# Patient Record
Sex: FEMALE | Race: BLACK OR AFRICAN AMERICAN | Employment: FULL TIME | URBAN - METROPOLITAN AREA
[De-identification: names, ages, dates, MRNs, and addresses within clinical notes are randomized per-mention and may not be internally consistent; named-entity substitution may affect disease eponyms.]

---

## 2017-10-23 ENCOUNTER — HOSPITAL ENCOUNTER (OUTPATIENT)
Dept: MAMMOGRAPHY | Age: 42
Discharge: HOME | End: 2017-10-23
Attending: OBSTETRICS & GYNECOLOGY
Payer: COMMERCIAL

## 2017-10-23 DIAGNOSIS — Z12.31 VISIT FOR SCREENING MAMMOGRAM: ICD-10-CM

## 2017-10-23 PROCEDURE — 77063 BREAST TOMOSYNTHESIS BI: CPT

## 2018-05-08 ENCOUNTER — HOSPITAL ENCOUNTER (OUTPATIENT)
Dept: MAMMOGRAPHY | Age: 43
Discharge: HOME | End: 2018-05-08
Payer: COMMERCIAL

## 2018-05-08 DIAGNOSIS — N63.20 BREAST MASS, LEFT: ICD-10-CM

## 2018-05-08 DIAGNOSIS — N63.10 BREAST MASS, RIGHT: ICD-10-CM

## 2018-05-08 PROCEDURE — 77066 DX MAMMO INCL CAD BI: CPT

## 2018-05-08 PROCEDURE — 76642 ULTRASOUND BREAST LIMITED: CPT

## 2019-10-09 ENCOUNTER — HOSPITAL ENCOUNTER (OUTPATIENT)
Dept: MAMMOGRAPHY | Age: 44
Discharge: HOME | End: 2019-10-09
Attending: NURSE PRACTITIONER

## 2019-10-09 DIAGNOSIS — Z12.39 BREAST CANCER SCREENING: ICD-10-CM

## 2019-10-10 NOTE — PROGRESS NOTES
Mammogram is normal. Repeat in one year. Please call patient with results. Shared to my chart.  Thanks, Pamela Mcgill

## 2020-10-29 ENCOUNTER — HOSPITAL ENCOUNTER (OUTPATIENT)
Dept: MAMMOGRAPHY | Age: 45
Discharge: HOME | End: 2020-10-29
Attending: NURSE PRACTITIONER
Payer: COMMERCIAL

## 2020-10-29 DIAGNOSIS — Z12.31 VISIT FOR SCREENING MAMMOGRAM: ICD-10-CM

## 2020-10-29 PROCEDURE — 77063 BREAST TOMOSYNTHESIS BI: CPT

## 2021-07-12 ENCOUNTER — HOSPITAL ENCOUNTER (OUTPATIENT)
Dept: PHYSICAL THERAPY | Age: 46
Discharge: HOME | End: 2021-07-12
Attending: ORTHOPAEDIC SURGERY
Payer: COMMERCIAL

## 2021-07-12 DIAGNOSIS — M25.561 RIGHT KNEE PAIN, UNSPECIFIED CHRONICITY: ICD-10-CM

## 2021-07-12 PROCEDURE — 97140 MANUAL THERAPY 1/> REGIONS: CPT

## 2021-07-12 PROCEDURE — 97110 THERAPEUTIC EXERCISES: CPT

## 2021-07-12 PROCEDURE — 97161 PT EVAL LOW COMPLEX 20 MIN: CPT

## 2021-07-12 NOTE — THERAPY EVALUATION
Olman Crum  : 1975  Primary: [facility]  Secondary:  [address]  at . [address]  [address] [facility] 25, [address]  [phone]   [phone]         OUTPATIENT PHYSICAL THERAPY:Initial Assessment 2021    ICD-10: Treatment Diagnosis:  Pain in right knee (M25.561)  Stiffness of right knee, not elsewhere classified (M25.661)  Other abnormalities of gait and mobility (R26.89)          Precautions/Allergies:   Biaxin [clarithromycin], Darvocet a500 [propoxyphene n-acetaminophen], Metronidazole hcl, and Sulfa (sulfonamide antibiotics)   Fall Risk Score: 0 (? 5 = High Risk)  MD Orders: Eval and Treat  MEDICAL/REFERRING DIAGNOSIS:  Right knee pain, unspecified chronicity [M25.561]   DATE OF ONSET: Approximately May 2021  REFERRING PHYSICIAN: Jair Mejia MD  RETURN PHYSICIAN APPOINTMENT: 2021     INITIAL ASSESSMENT:  Ms. Sofia Scott presents to physical therapy with decreased strength, ROM, joint mobility, functional mobility. These S/S are consistent with right knee pain causing gait disturbance and limiting weightbearing activity. Patient will benefit from skilled physical therapy for manual therapeutic techniques (as appropriate), therapeutic exercises and activities, balance and comprehensive home exercises program to address current impairments and functional limitations. Olman Crum will benefit from skilled PT (medically necessary) in order to address above deficits affecting participation in basic ADLs and overall functional tolerance. PROBLEM LIST (Impacting functional limitations):   1. Decreased Strength  2. Decreased ADL/Functional Activities  3. Decreased Transfer Abilities  4. Decreased Ambulation Ability/Technique  5. Decreased Balance  6. Increased Pain  7. Decreased Joint mobility/Flexibility   8. Decreased Activity Tolerance  9.  Decreased [place] with Home Exercise Program INTERVENTIONS PLANNED:   1. Balance Exercise  2. Bed Mobility  3. Cold  4. Cryotherapy  5. Family Education  6. Gait Training  7. Heat  8. Home Exercise Program (HEP)  9. Manual Therapy  10. Neuromuscular Re-education/Strengthening  11. Range of Motion (ROM)  12. Therapeutic Activites  13. Therapeutic Exercise/Strengthening  14. Transfer Training  15. Ultrasound (US)  16. Vasopneumatic Compression  17. Aquatic Therapy          TREATMENT PLAN:  Effective Dates: 7/12/2021 TO 9/11/2021 (60 days). Frequency/Duration: 2 times a week for 60 Days    GOALS: (Goals have been discussed and agreed upon with patient.)   Short-Term Goals~4 weeks  Goal Met   1. Dolly Morgan will be independent with HEP for strength and ROM 1.  [] Date:   2. Dolly Morgan will tolerate manual therapy/joint mobilizations to increase knee flexion ROM so pt can ambulate stairs and walk with a more normalized gait pattern. 2.  [] Date:   3. Dolly Morgan will participate in LE strengthening exercises for hip, knee, ankle with weight as appropriate for 3 sets of 10. 3.  [] Date:   4. Dolly Morgan will tolerate scar massage as appropriate to improve tissue mobility with patient to perform independently after education 4. [] Date:   5. Dolly Morgan will participate in static and dynamic balance activities for 5 minutes to help improve proprioception and decrease risk of falls 5. [] Date:   6. Dolly Morgan to increase lower extremity functional scale by 10 points to show improvement in areas of difficulty 6. [] Date:   7. Dolly Morgan will demonstrate Right knee flexion >= 140 degrees to improve functional mobility and tolerance of ADLs. 7.  [] Date:   8. Dolly Morgan will demonstrate Right knee extension >= 0 degrees to improve functional mobility and tolerance of ADLs 8. [] Date:   5. Dolly Morgan will improve MMT Right LE to >=4+/5 to improve current level of independence and community reintegration. 9.  [] Date:         Long Term Goals~8 weeks Goal Met   1. Yesy Valladares will be independent in HEP of stretching and strengthening 1. [] Date:   2. Yesy Valladares will be able to perform sit to stand transfers independently with increased knee flexion and decreased use of upper extremities 2. [] Date:   3. Yesy Valladares will ascend/descend 12 steps with reciprocal gait pattern and rail 3. [] Date:   4. Yesy Valladares to increase lower extremity functional scale by 10 points to show improvement in areas of difficulty  4. [] Date:                 Outcome Measure: Tool Used: Lower Extremity Functional Scale (LEFS)    Score:  Initial: 68/80 Most Recent: X/80 (Date: -- )   Interpretation of Score: 20 questions each scored on a 5 point scale with 0 representing \"extreme difficulty or unable to perform\" and 4 representing \"no difficulty\". The lower the score, the greater the functional disability. 80/80 represents no disability. Minimal detectable change is 9 points. Medical Necessity:   · Skilled intervention continues to be required due to current impairment. Reason for Services/Other Comments:  · Patient continues to require skilled intervention due to patient continues to present with impairments assessed at initial evaluation and requiring skilled physical therapy to meet goals for PT. Total Treatment Duration:  PT Patient Time In/Time Out  Time In: 1620  Time Out: 1715      Rehabilitation Potential For Stated Goals: Excellent  Regarding Anastasiia Rocha's therapy, I certify that the treatment plan above will be carried out by a therapist or under their direction. Thank you for this referral,  Ally Martins, PT     Referring Physician Signature: Flaca Tidwell MD              Date                  HISTORY:   History of Present Injury/Illness (Reason for Referral):  Patient reports unknown cause of medial right knee pain starting in May of 2021.  She states that it may be related to increased exercise from March to May for which she has stopped secondary to pain. -Present Symptoms (on day of evaluation):       Pain Severity:  · Currently: 3/10  · Best: 0/10  · Worst: 6/10    · Aggravating factors: going up and down stairs, standing, walking, running   · Relieving factors: Rest and Ice  · Irritability: Medium (Onset of pain is equal to alleviation)    Past Medical History/Comorbidities:   Ms. Akila Palafox  has a past medical history of Arrhythmia (2004). Ms. Akila Palafox  has a past surgical history that includes pr cardiac surg procedure unlist (2004). Active Ambulatory Problems     Diagnosis Date Noted    No Active Ambulatory Problems     Resolved Ambulatory Problems     Diagnosis Date Noted    No Resolved Ambulatory Problems     Past Medical History:   Diagnosis Date    Arrhythmia 2004     Social History/Living Environment:        Social History     Socioeconomic History    Marital status:      Spouse name: Not on file    Number of children: Not on file    Years of education: Not on file    Highest education level: Not on file   Occupational History    Not on file   Tobacco Use    Smoking status: Never Smoker    Smokeless tobacco: Never Used   Substance and Sexual Activity    Alcohol use: No     Comment: occasional    Drug use: No    Sexual activity: Yes     Partners: Male   Other Topics Concern    Not on file   Social History Narrative    Not on file     Social Determinants of Health     Financial Resource Strain:     Difficulty of Paying Living Expenses:    Food Insecurity:     Worried About Running Out of Food in the Last Year:     Ran Out of Food in the Last Year:    Transportation Needs:     Lack of Transportation (Medical):      Lack of Transportation (Non-Medical):    Physical Activity:     Days of Exercise per Week:     Minutes of Exercise per Session:    Stress:     Feeling of Stress :    Social Connections:     Frequency of Communication with Friends and Family:     Frequency of Social Gatherings with Friends and Family:     Attends [employer] Services:     Active Member of Clubs or Organizations:     Attends Club or Organization Meetings:     Marital Status:    Intimate Partner Violence:     Fear of Current or Ex-Partner:     Emotionally Abused:     Physically Abused:     Sexually Abused:      Prior Level of Function/Work/Activity:  Normal  Previous Treatment Approach  Injections  Other Clinical Tests:  X-RAY Negative for Fx or MMT  Current Medications:    Current Outpatient Medications:     aspirin delayed-release 81 mg tablet, Take 81 mg by mouth., Disp: , Rfl:     ferrous sulfate (Slow Fe) 142 mg (45 mg iron) ER tablet, Take 1 Tablet by mouth daily. , Disp: , Rfl:     fexofenadine-pseudoephedrine (ALLEGRA-D 24 HOUR) 180-240 mg per tablet, Take 1 Tab by mouth daily. , Disp: , Rfl:       Ambulatory/Rehab Services H2 Model Falls Risk Assessment    Risk Factors:       No Risk Factors Identified Ability to Rise from Chair:       (0)  Ability to rise in a single movement    Falls Prevention Plan:       No modifications necessary   Total: (5 or greater = High Risk): 0    ©2010 [facility]. All Rights Reserved. [facility] [employer] Patent #[id number].  Federal Law prohibits the replication, distribution or use without written permission from [facility] [employer]         Date Last Reviewed:  7/12/2021   Number of Personal Factors/Comorbidities that affect the Plan of Care: 0: LOW COMPLEXITY   EXAMINATION:   Observation/Orthostatic Postural Assessment:   Gait:  Slight decreased SLS with increased pronation right  Slightly Genu Valgus right  Palpation:  Assessed @ Initial Visit: Tenderness to medial right knee    AROM/PROM         Joint: Eval Date: 7/12/2021  Re-Assess Date:  Re-Assess Date:    Active ROM RIGHT LEFT RIGHT LEFT RIGHT LEFT   Knee Extension 1 Hyper 1           Knee Flexion 127 140           Hip Flexion WNL WNL           Ankle mobility WNL WNL                                               Passive ROM Knee Extension 0 WNL           Knee Flexion 133 WNL             Strength:     Eval Date: 7/12/2021  Re-Assess Date:  Re-Assess Date:      RIGHT LEFT RIGHT LEFT RIGHT LEFT   Knee Flexion  4+/5  5/5           Knee Extension  4+/5  5/5           Hip Flexion  4+/5  5/5           Hip Abduction  4/5  4+/5           Hip Extension  4+/5  5/5           Ankle Dorsiflexion   5/5 5/5           Ankle Plantarflexion 5/5 5/5             Special Tests:   Valgus Stress: Negative  Varus Stress: Negative  Hussain: Negative      Manual:  Initial Evaluation           Joint Directon Grade Treatment Effect   Patella Lateral Glide, Medial Glide, Superior Glide and Inferior Glide II and III Unremarkable       Neurological Screen:  No radiating symptoms down leg    Functional Mobility:   Sit to Stand= Normal  Squat= 1/2  Single Leg Step Down= 6\"      Balance and Mobility:  Test Result   Timed up and Go 7 Seconds   30 second Sit to Stand 14   Single Leg Balance Right:        <30 seconds     Left:   <30 seconds          Body Structures Involved:  1. Bones  2. Joints  3. Muscles  4. Ligaments Body Functions Affected:  1. Sensory/Pain  2. Neuromusculoskeletal  3. Movement Related Activities and Participation Affected:  1. Mobility  2. Self Care   Number of elements that affect the Plan of Care: 1-2: LOW COMPLEXITY   CLINICAL PRESENTATION:   Presentation: Stable and uncomplicated: LOW COMPLEXITY   CLINICAL DECISION MAKING:      Use of outcome tool(s) and clinical judgement create a POC that gives a: Clear prediction of patient's progress: LOW COMPLEXITY   See treatment note for associated treatment provided today.       Future Appointments   Date Time Provider Gary Munoz   7/15/2021  7:30 AM Harvey Arce PT [facility]   7/19/2021  7:30 AM Harvey Arce PT [facility]   7/21/2021  7:30 AM DAVID VuBETSY [facility]   7/26/2021  7:30 AM Harvey Arce PT [facility]   7/29/2021  7:30 AM Lulion Arce, PT [facility]   8/2/2021  7:30 AM Luwanna Yazmin, PT [facility]   8/5/2021  7:30 AM Lujaymiea Yazmin, PT [facility]   8/9/2021  7:30 AM Harvey Arce, PT [facility]   8/12/2021  7:30 AM Lujaymiea Yazmin, PT [facility]   8/16/2021  7:30 AM Harvey Arce, PT [facility]   8/17/2021  9:45 AM Jane Keys MD [facility]   8/19/2021  7:30 AM Harvey Arce, PT [facility]   8/23/2021  7:30 AM Harvey Arce, PT [facility]   8/25/2021  7:30 AM Harvey Arce, PT [facility]         Marcha Axel, PT

## 2021-07-12 NOTE — PROGRESS NOTES
Liz Grullon  : 1975  Payor: [employer] / Plan: [facility] HMO/CHOICE PLUS/POS / Product Type: HMO /  [id number] at [address] [place] [address]. [address] [place] [address] [address] [place] [address], [facility], [address] [place] [address] Road  [phone]   [phone]                                                          Sixto Alba MD      OUTPATIENT PHYSICAL THERAPY: Daily Treatment Note 2021 Visit Count:  1    Tx Diagnosis:  Pain in right knee (M25.561)  Stiffness of right knee, not elsewhere classified (M25.661)  Other abnormalities of gait and mobility (R26.89)      Pre-treatment Symptoms/Complaints: See Initial Eval Dated 2021 for more details. Pain: Initial:3/10  Medications Last Reviewed:  2021     Post Session: 1/10   Updated Objective Findings: See Initial Eval for more details. TREATMENT:   THERAPEUTIC EXERCISE: (15 minutes):  Exercises per grid below to improve mobility, strength and balance. Required minimal visual, verbal and manual cues to promote proper body alignment and promote proper body posture. Progressed resistance and complexity of movement as indicated. Date:  2021 Date:   Date:     Activity/Exercise Parameters Parameters Parameters   Education HEP, POC, PT goals, anatomy/pathology     TM      Nustep      Calf stretch [id number]     Hamstring stretch 2x90cxi     Piriformis stretch [id number]     IT Band stretch 1z55mfl     Quad set [id number]     SLR 2x10     Heel slide      LAQ            THERAPEUTIC ACTIVITY: ( 0 minutes): Activities per gid below to improve functional movement related mobility, strength and balance to improve neuro-muscular carryover to daily functional activities for improving patient's quality of life. Required visual, verbal and manual cues to promote proper body alignment and promote proper body posture/mechanics. Progressed resistance and complexity of movement as indicated.      Date:  2021 Date:   Date:     Activity/Exercise Parameters Parameters Parameters                                                                               MANUAL THERAPY: (8 minutes): Joint mobilization, Soft tissue mobilization was utilized and necessary because of the patient's restricted joint motion and restricted motion of soft tissue mobility. Date  7/12/2021    Technique Used Grade  Level # Time(s) Effect while being performed          Patella 4 way II III Right 4min Improved Mobility          PROM  Right LE 4min Improved ROM          Tissue Mobilization                           HEP Log Date 1.    7/12/2021   2.  7/12/2021   3. 7/12/2021   4.    5.           [employer] Portal  Treatment/Session Summary:    Response to Treatment: Pt demonstrated understanding of POC and initial HEP. No increase in pain or adverse reactions. Communication/Consultation:  POC, HEP, PT goals, Faxed initial evaluation to MD.   Equipment provided today: HEP Handout   Recommendations/Intent for next treatment session:   Next visit will focus on Manual Therapy Core Stability Quad strengthening Hip strengthening soft tissue mobilization. Treatment Plan of Care Effective Dates: 7/12/2021 TO 9/11/2021 (60 days).   Frequency/Duration: 2 times a week for 60 Days             Total Treatment Billable Duration:   23  Rx plus Eval   PT Patient Time In/Time Out  Time In: 1620  Time Out: [address] Eric Jo, PT    Future Appointments   Date Time Provider Gary Munoz   7/15/2021  7:30 AM [facility]ell, [facility]   7/19/2021  7:30 AM Rudolm Sell, PT [facility]   7/21/2021  7:30 AM Rudolm Sell, PT [facility]   7/26/2021  7:30 AM Rudolm Sell, PT [facility]   7/29/2021  7:30 AM Rudolm Sell, PT [facility]   8/2/2021  7:30 AM Rudolm Sell, PT [facility]   8/5/2021  7:30 AM Rudolm Sell, PT [facility]   8/9/2021  7:30 AM Rudolm Sell, PT [facility]   8/12/2021  7:30 AM Dee Estrin, PT [facility]   8/16/2021  7:30 AM Dee Estrin, PT [facility]   8/17/2021  9:45 AM Willie Joe MD [facility]   8/19/2021  7:30 AM Dee Estrin, PT [facility]   8/23/2021  7:30 AM Dee Estrin, PT [facility]   8/25/2021  7:30 AM Dee Estrin, PT [facility]

## 2021-07-15 ENCOUNTER — HOSPITAL ENCOUNTER (OUTPATIENT)
Dept: PHYSICAL THERAPY | Age: 46
Discharge: HOME | End: 2021-07-15
Attending: ORTHOPAEDIC SURGERY
Payer: COMMERCIAL

## 2021-07-15 PROCEDURE — 97110 THERAPEUTIC EXERCISES: CPT

## 2021-07-15 PROCEDURE — 97530 THERAPEUTIC ACTIVITIES: CPT

## 2021-07-15 NOTE — PROGRESS NOTES
Nathan Uribe  : 1975  Payor: [facility] / Plan: [facility] HMO/CHOICE PLUS/POS / Product Type: HMO /  [address] [place]. [address] [facility] [address] [facility], [facility], [address] [place] [address] Road  [phone]   [phone]                                                          Cece Rubio MD      OUTPATIENT PHYSICAL THERAPY: Daily Treatment Note 7/15/2021 Visit Count:  2    Tx Diagnosis:  Pain in right knee (M25.561)  Stiffness of right knee, not elsewhere classified (M25.661)  Other abnormalities of gait and mobility (R26.89)      Pre-treatment Symptoms/Complaints: Patient reports less burning in right knee   Pain: Initial:2/10  Medications Last Reviewed:  7/15/2021     Post Session: 1/10   Updated Objective Findings: See Initial Eval for more details. TREATMENT:   THERAPEUTIC EXERCISE: (32 minutes):  Exercises per grid below to improve mobility, strength and balance. Required minimal visual, verbal and manual cues to promote proper body alignment and promote proper body posture. Progressed resistance and complexity of movement as indicated. Date:  2021 Date:  7/15/2021 Date:     Activity/Exercise Parameters Parameters Parameters   Education HEP, POC, PT goals, anatomy/pathology     TM      Bike  8min    Calf stretch [id number] 0l19gbx    Hamstring stretch [id number] L 8u82xuo    Piriformis stretch [id number] Seated 2f17vpc    IT Band stretch 2s63vvb     Quad set 26l6dew     SLR 2x10 Combo 2x10 w/SAQ          LAQ  3x10     Squat  3x8 Abd red    3 way hip swing  2x10 pad                THERAPEUTIC ACTIVITY: ( 8 minutes): Activities per gid below to improve functional movement related mobility, strength and balance to improve neuro-muscular carryover to daily functional activities for improving patient's quality of life. Required visual, verbal and manual cues to promote proper body alignment and promote proper body posture/mechanics.  Progressed resistance and complexity of movement as indicated. Date:  7/15/2021 Date:   Date:     Activity/Exercise Parameters Parameters Parameters   Lateral Walk 3x20' red       Monster Walk 3x20' red                                                               MANUAL THERAPY: (0 minutes): Joint mobilization, Soft tissue mobilization was utilized and necessary because of the patient's restricted joint motion and restricted motion of soft tissue mobility. Date  7/15/2021    Technique Used Grade  Level # Time(s) Effect while being performed          Patella 4 way II III Right 0min Improved Mobility          PROM  Right LE 0min Improved ROM          Tissue Mobilization                           HEP Log Date 1.    7/15/2021   2.  7/15/2021   3. 7/15/2021   4.    5.           [employer] Portal  Treatment/Session Summary:    Response to Treatment: Patient was challenged with increase requiring verbal cueing for VMO contraction and valgus control   Communication/Consultation:  Review posture   Equipment provided today: Not today   Recommendations/Intent for next treatment session:   Next visit will focus on Manual Therapy Core Stability Quad strengthening Hip strengthening soft tissue mobilization. Treatment Plan of Care Effective Dates: 7/12/2021 TO 9/11/2021 (60 days).   Frequency/Duration: 2 times a week for 60 Days             Total Treatment Billable Duration:   40 Rx  PT Patient Time In/Time Out  Time In: 0730  Time Out: 29[address] Carisa Sands PT    Future Appointments   Date Time Provider Gary Munoz   7/19/2021  7:30 AM Deanna [facility]onseca PT [facility] [place] [facility]   7/21/2021  7:30 AM Deanna Fonseca, PT [facility]   7/26/2021  7:30 AM Deanna Fonseca PT [facility]   7/29/2021  7:30 AM Deanna Fonseca, PT [facility]   8/2/2021  7:30 AM Deanna Fonseca, PT [facility]   8/5/2021  7:30 AM Deanna Fonseca, PT [facility]   8/9/2021 7:30 AM Satira Lent, PT [facility]   8/12/2021  7:30 AM Satira Lent, PT [facility]   8/16/2021  7:30 AM Satira Lent, PT [facility]   8/17/2021  9:45 AM Nayana Palomares MD [facility]   8/19/2021  7:30 AM Satira Lent, PT [facility]   8/23/2021  7:30 AM Satira Lent, PT [facility]   8/25/2021  7:30 AM Satira Lent, PT [facility]

## 2021-07-19 ENCOUNTER — HOSPITAL ENCOUNTER (OUTPATIENT)
Dept: PHYSICAL THERAPY | Age: 46
Discharge: HOME | End: 2021-07-19
Attending: ORTHOPAEDIC SURGERY
Payer: COMMERCIAL

## 2021-07-19 PROCEDURE — 97110 THERAPEUTIC EXERCISES: CPT

## 2021-07-19 PROCEDURE — 97530 THERAPEUTIC ACTIVITIES: CPT

## 2021-07-19 NOTE — PROGRESS NOTES
Baljit Rodriguez  : 1975  Payor: Shahzad Plunkett / Plan: BSI [facility] HMO/CHOICE PLUS/POS / Product Type: HMO /  [address] at [address] [place]. [address] [place], [address] [address]  [phone]   [phone]                                                          Radha Kelly MD      OUTPATIENT PHYSICAL THERAPY: Daily Treatment Note 2021 Visit Count:  3    Tx Diagnosis:  Pain in right knee (M25.561)  Stiffness of right knee, not elsewhere classified (M25.661)  Other abnormalities of gait and mobility (R26.89)      Pre-treatment Symptoms/Complaints: Patient reports doing some better just mild soreness   Pain: Initial:1/10  Medications Last Reviewed:  2021     Post Session: 1/10   Updated Objective Findings: Anterior rotation right innominate        TREATMENT:   THERAPEUTIC EXERCISE: (31 minutes):  Exercises per grid below to improve mobility, strength and balance. Required minimal visual, verbal and manual cues to promote proper body alignment and promote proper body posture. Progressed resistance and complexity of movement as indicated. Date:  2021 Date:  7/15/2021 Date:  2021   Activity/Exercise Parameters Parameters Parameters   Education HEP, POC, PT goals, anatomy/pathology     TM      Bike  8min 8 min   Calf stretch 0g23odm 1r72cgp 1r80vsy   Hamstring stretch 2v46odn L 6r36ltn L 2a84sps   Piriformis stretch 4p81kre Seated 6u92rce Seated 8g25xig   IT Band stretch 3d30zvf     Quad set 51r9gnu     SLR 2x10 Combo 2x10 w/SAQ    Squat stretch   8z59xmn   LAQ  3x10 5#    Squat  3x8 Abd red 3x8 15 # Abd orange    3 way hip swing  2x10 pad Static 8m49fvk each   Curtsy squat   3x8 each         THERAPEUTIC ACTIVITY: ( 12 minutes): Activities per gid below to improve functional movement related mobility, strength and balance to improve neuro-muscular carryover to daily functional activities for improving patient's quality of life. Required visual, verbal and manual cues to promote proper body alignment and promote proper body posture/mechanics. Progressed resistance and complexity of movement as indicated. Date:  7/15/2021 Date:  7/19/2021 Date:     Activity/Exercise Parameters Parameters Parameters   Lateral Walk 3x20' red 3x20'orange     Monster Walk 3x20' red 3x20' orange     [place] Carry   3 laps                                                   MANUAL THERAPY: (0 minutes): Joint mobilization, Soft tissue mobilization was utilized and necessary because of the patient's restricted joint motion and restricted motion of soft tissue mobility. Date  7/19/2021    Technique Used Grade  Level # Time(s) Effect while being performed          Patella 4 way II III Right 0min Improved Mobility          PROM  Right LE 0min Improved ROM          Tissue Mobilization                           HEP Log Date 1.    7/19/2021   2.  7/19/2021   3. 7/19/2021   4.    5.           [employer] Portal  Treatment/Session Summary:    Response to Treatment: Patient had good tolerance to increased activity, equal innominate after MET   Communication/Consultation:  Review posture   Equipment provided today: Not today   Recommendations/Intent for next treatment session:   Next visit will focus on Manual Therapy Core Stability Quad strengthening Hip strengthening soft tissue mobilization. Treatment Plan of Care Effective Dates: 7/12/2021 TO 9/11/2021 (60 days).   Frequency/Duration: 2 times a week for 60 Days             Total Treatment Billable Duration:   43 Rx  PT Patient Time In/Time Out  Time In: 0730  Time Out: 2900 Carisa Sands PT    Future Appointments   Date Time Provider Gary Munoz   7/21/2021  7:30 AM Saintclair Res, PT [facility]   7/26/2021  7:30 AM Saintclair Res, PT [facility]   7/29/2021  7:30 AM Saintclair Res[facility]   8/2/2021  7:30 AM Saintclair Res, PT [facility]   8/5/2021  7:30 AM Rebbecca Sham, PT [facility]   8/9/2021  7:30 AM Rebbecca Sham, PT [facility]   8/12/2021  7:30 AM Rebbecca Sham, PT [facility]   8/16/2021  7:30 AM Rebbecca Sham, PT [facility]   8/17/2021  9:45 AM Sixto Alab MD [facility]   8/19/2021  7:30 AM Rebbecca Sham, PT [facility]   8/23/2021  7:30 AM Rebbecca Sham, PT [facility]   8/25/2021  7:30 AM Rebbecca Sham, PT [facility]

## 2021-07-21 ENCOUNTER — HOSPITAL ENCOUNTER (OUTPATIENT)
Dept: PHYSICAL THERAPY | Age: 46
Discharge: HOME | End: 2021-07-21
Attending: ORTHOPAEDIC SURGERY
Payer: COMMERCIAL

## 2021-07-21 PROCEDURE — 97110 THERAPEUTIC EXERCISES: CPT

## 2021-07-21 PROCEDURE — 97530 THERAPEUTIC ACTIVITIES: CPT

## 2021-07-21 NOTE — PROGRESS NOTES
Chris Mukherjee  : 1975  Payor: Ewelina Frames / Plan: [facility] HMO/CHOICE PLUS/POS / Product Type: HMO /  Kivalina Stagger at [address] [place]. [address], [id number] [place] [address] Road  [phone]   [phone]                                                          Miranda Solomon MD      OUTPATIENT PHYSICAL THERAPY: Daily Treatment Note 2021 Visit Count:  4    Tx Diagnosis:  Pain in right knee (M25.561)  Stiffness of right knee, not elsewhere classified (M25.661)  Other abnormalities of gait and mobility (R26.89)      Pre-treatment Symptoms/Complaints: Patient reports less soreness and increasing in activity, still feel unsure about returning to workouts   Pain: Initial:1/10  Medications Last Reviewed:  2021     Post Session: 1/10   Updated Objective Findings: Equal innominate        TREATMENT:   THERAPEUTIC EXERCISE: (31 minutes):  Exercises per grid below to improve mobility, strength and balance. Required minimal visual, verbal and manual cues to promote proper body alignment and promote proper body posture. Progressed resistance and complexity of movement as indicated.      Date:  2021 Date:  7/15/2021 Date:  2021 Date:  2021   Activity/Exercise Parameters Parameters Parameters    Education HEP, POC, PT goals, anatomy/pathology      TM       Bike  8min 8 min 6min   Calf stretch 9k86hhz 5j12ndi 5x91kmf 4g74mot   Hamstring stretch 8w81avl L 4s60afr L 6b21lvt L 8h00pkh   Piriformis stretch 9i46rsw Seated 4w21hgr Seated 3c94tib Seated 8g56vei   IT Band stretch 6t06fhw      Dynamic stretch       Quad set 24j2tus      SLR 2x10 Combo 2x10 w/SAQ     Squat stretch   7j06yzt    LAQ  3x10 5#     Squat  3x8 Abd red 3x8 15 # Abd orange  3x5 20 # Abd orange touch   3 way hip swing  2x10 pad Static 6c05yky each Squat reach pad 2x5 each   Curtsy squat   3x8 each    Walking Lunge    6x20'   Shuttle    3x10 100#                THERAPEUTIC ACTIVITY: ( 13 minutes): Activities per gid below to improve functional movement related mobility, strength and balance to improve neuro-muscular carryover to daily functional activities for improving patient's quality of life. Required visual, verbal and manual cues to promote proper body alignment and promote proper body posture/mechanics. Progressed resistance and complexity of movement as indicated. Date:  7/15/2021 Date:  7/19/2021 Date:  7/21/2021   Activity/Exercise Parameters Parameters Parameters   Lateral Walk 3x20' red 3x20'orange 4x20'orange   Monster Walk 3x20' red 3x20' orange 4x20'orange   [place]   3 laps 4 laps 2 15#                                                 MANUAL THERAPY: (0 minutes): Joint mobilization, Soft tissue mobilization was utilized and necessary because of the patient's restricted joint motion and restricted motion of soft tissue mobility. Date  7/21/2021    Technique Used Grade  Level # Time(s) Effect while being performed          Patella 4 way II III Right 0min Improved Mobility          PROM  Right LE 0min Improved ROM          Tissue Mobilization                           HEP Log Date 1.    7/21/2021   2.  7/21/2021   3. 7/21/2021   4.    5.           [employer] Portal  Treatment/Session Summary:    Response to Treatment: Patient had good tolerance to increased activity and less soreness in medial right knee. Communication/Consultation:  Review posture   Equipment provided today: Not today   Recommendations/Intent for next treatment session:   Next visit will focus on Manual Therapy Core Stability Quad strengthening Hip strengthening soft tissue mobilization. Treatment Plan of Care Effective Dates: 7/12/2021 TO 9/11/2021 (60 days).   Frequency/Duration: 2 times a week for 60 Days             Total Treatment Billable Duration:   44 Rx  PT Patient Time In/Time Out  Time In: 8865  Time Out: 402 [facility], PT    Future Appointments Date Time Provider Gary Munoz   7/26/2021  7:30 AM Pleas Brod, PT [facility]   7/29/2021  7:30 AM Pleas Brod, PT [facility]   8/2/2021  7:30 AM Pleas Brod, PT [facility]   8/5/2021  7:30 AM Pleas Brod, PT [facility]   8/9/2021  7:30 AM Pleas Brod, PT [facility]   8/12/2021  7:30 AM Pleas Brod, PT [facility]   8/16/2021  7:30 AM Pleas Brod, PT [facility]   8/17/2021  9:45 AM Miguelito Rodney MD [facility]   8/19/2021  7:30 AM Pleas Brod, PT [facility]   8/23/2021  7:30 AM Pleas Brod, PT [facility]   8/25/2021  7:30 AM Pleas Brod, PT [facility]

## 2021-07-26 ENCOUNTER — HOSPITAL ENCOUNTER (OUTPATIENT)
Dept: PHYSICAL THERAPY | Age: 46
Discharge: HOME | End: 2021-07-26
Attending: ORTHOPAEDIC SURGERY
Payer: COMMERCIAL

## 2021-07-26 PROCEDURE — 97110 THERAPEUTIC EXERCISES: CPT

## 2021-07-26 PROCEDURE — 97530 THERAPEUTIC ACTIVITIES: CPT

## 2021-07-26 NOTE — PROGRESS NOTES
Aman Hassan  : 1975  Payor: Yazmin Yung / Plan: [facility]SI [facility] HMO/CHOICE PLUS/POS / Product Type: HMO /  [address] at [address] [place]. [address], [address], [address] [place] [address] Road  [phone]   [phone]                                                          Sweetie Boucher MD      OUTPATIENT PHYSICAL THERAPY: Daily Treatment Note 2021 Visit Count:  5    Tx Diagnosis:  Pain in right knee (M25.561)  Stiffness of right knee, not elsewhere classified (M25.661)  Other abnormalities of gait and mobility (R26.89)      Pre-treatment Symptoms/Complaints: Patient reports feeling stronger   Pain: Initial:1/10  Medications Last Reviewed:  2021     Post Session: 0/10   Updated Objective Findings: Anterior rotation right innominate        TREATMENT:   THERAPEUTIC EXERCISE: (33 minutes):  Exercises per grid below to improve mobility, strength and balance. Required minimal visual, verbal and manual cues to promote proper body alignment and promote proper body posture. Progressed resistance and complexity of movement as indicated.      Date:  2021 Date:  7/15/2021 Date:  2021 Date:  2021 Date:  2021   Activity/Exercise Parameters Parameters Parameters     Education HEP, POC, PT goals, anatomy/pathology       TM        Bike  8min 8 min 6min 8 min   Calf stretch 0u29yrt 9d07ybh 5h97gpx 3e54taf 3g49tup   Hamstring stretch 3m94lay L 9d43erb L 4r77egf L 2d82djq L 6n19uxl   Piriformis stretch 7m17ede Seated 9s23abz Seated 1k14ekw Seated 8o58hys Seated 3n28nno   IT Band stretch 9c84llu       Dynamic stretch     2min   Quad set 00t8xxt       SLR 2x10 Combo 2x10 w/SAQ      Squat stretch   1c04rjd     LAQ  3x10 5#      Squat  3x8 Abd red 3x8 15 # Abd orange  3x5 20 # Abd orange touch    3 way hip swing  2x10 pad Static 0n66ttn each Squat reach pad 2x5 each    Curtsy squat   3x8 each  Rings 3x8 each   Walking Lunge    6x20' 6x20'  2 10# Shuttle    3x10 100# 3x10 125#   RDL     2x10 5#   Lateral shuffle      4x30'         THERAPEUTIC ACTIVITY: ( 11 minutes): Activities per gid below to improve functional movement related mobility, strength and balance to improve neuro-muscular carryover to daily functional activities for improving patient's quality of life. Required visual, verbal and manual cues to promote proper body alignment and promote proper body posture/mechanics. Progressed resistance and complexity of movement as indicated. Date:  7/15/2021 Date:  7/19/2021 Date:  7/21/2021 Date:  7/26/2021   Activity/Exercise Parameters Parameters Parameters    Lateral Walk 3x20' red 3x20'orange 4x20'orange 4x20'purple   Monster Walk 3x20' red 3x20' orange 4x20'orange 4x20'purple   [employer]   3 laps 4 laps 2 15# 4 laps 2 20#                                                         MANUAL THERAPY: (0 minutes): Joint mobilization, Soft tissue mobilization was utilized and necessary because of the patient's restricted joint motion and restricted motion of soft tissue mobility. Date  7/26/2021    Technique Used Grade  Level # Time(s) Effect while being performed          Patella 4 way II III Right 0min Improved Mobility          PROM  Right LE 0min Improved ROM          Tissue Mobilization                           HEP Log Date 1.    7/26/2021   2.  7/26/2021   3. 7/26/2021   4.    5.           [employer] Portal  Treatment/Session Summary:    Response to Treatment: Patient responded well to increased strengthening, improved stability with SL activity. Communication/Consultation:  Review posture   Equipment provided today: Not today   Recommendations/Intent for next treatment session:   Next visit will focus on Manual Therapy Core Stability Quad strengthening Hip strengthening soft tissue mobilization. Treatment Plan of Care Effective Dates: 7/12/2021 TO 9/11/2021 (60 days).   Frequency/Duration: 2 times a week for 60 Days             Total Treatment Billable Duration:   44 Rx  PT Patient Time In/Time Out  Time In: 0554  Time Out: 402 [facility], PT    Future Appointments   Date Time Provider Gary Munoz   7/29/2021  7:30 AM Tiffani Negron, PT [facility]   8/2/2021  7:30 AM Ranny Jamil, PT [facility]   8/5/2021  7:30 AM Ranny Jamil, PT [facility]   8/9/2021  7:30 AM Ranny Jamil, PT [facility]   8/12/2021  7:30 AM Ranny Jamil, PT [facility]   8/16/2021  7:30 AM Ranny Jamil, PT [facility]   8/17/2021  9:45 AM Scot Naylor MD [facility] POA   8/19/2021  7:30 AM Ranny Jamil, PT [facility]   8/23/2021  7:30 AM Ranny Jamil, PT [facility]   8/25/2021  7:30 AM Ranny Jamil, PT [facility]

## 2021-07-29 ENCOUNTER — HOSPITAL ENCOUNTER (OUTPATIENT)
Dept: PHYSICAL THERAPY | Age: 46
Discharge: HOME | End: 2021-07-29
Attending: ORTHOPAEDIC SURGERY
Payer: COMMERCIAL

## 2021-07-29 PROCEDURE — 97530 THERAPEUTIC ACTIVITIES: CPT

## 2021-07-29 PROCEDURE — 97110 THERAPEUTIC EXERCISES: CPT

## 2021-07-29 NOTE — PROGRESS NOTES
Hakeem Odonnell  : 1975  Payor: Linnette Walsh / Plan: [facility] HMO/CHOICE PLUS/POS / Product Type: HMO /  [address] [place]. Barron Cordon, [address] [place] [place], [address]  [phone]   [phone]                                                          Artie Hastings MD      OUTPATIENT PHYSICAL THERAPY: Daily Treatment Note 2021 Visit Count:  6    Tx Diagnosis:  Pain in right knee (M25.561)  Stiffness of right knee, not elsewhere classified (M25.661)  Other abnormalities of gait and mobility (R26.89)      Pre-treatment Symptoms/Complaints: Patient reports doing better, did have an MRI showing small MMT right   Pain: Initial:1/10  Medications Last Reviewed:  2021     Post Session: 0/10   Updated Objective Findings: Equal innominate        TREATMENT:   THERAPEUTIC EXERCISE: (30 minutes):  Exercises per grid below to improve mobility, strength and balance. Required minimal visual, verbal and manual cues to promote proper body alignment and promote proper body posture. Progressed resistance and complexity of movement as indicated.      Date:  7/15/2021 Date:  2021 Date:  2021 Date:  2021 Date:  2021   Activity/Exercise Parameters Parameters      Education        TM        Bike 8min 8 min 6min 8 min 7min   Calf stretch 2a30aup 3f55vip 6p42ymr 0q73ske 3q21zyw   Hamstring stretch L 6z19xqk L 4k55lgj L 1w41blo L 1p93ymg L 1v97hnn   Piriformis stretch Seated 8v30jqk Seated 7q35cwq Seated 2d60ppi Seated 1a44xhe Seated 8q02fpo   IT Band stretch        Dynamic stretch    2min 2min   Quad set        SLR Combo 2x10 w/SAQ       Squat stretch  9d53kma      LAQ 3x10 5#       Squat 3x8 Abd red 3x8 15 # Abd orange  3x5 20 # Abd orange touch  3x5 20# box   3 way hip swing 2x10 pad Static 7b20ivi each Squat reach pad 2x5 each  Opp slide 3x5 each   Curtsy squat  3x8 each  Rings 3x8 each    Walking Lunge   6x20' 6x20'  2 10# 6x20' 2 10#   Shuttle   3x10 100# 3x10 125#    RDL    2x10 5# 2x10 5#   Lateral shuffle     4x30'          THERAPEUTIC ACTIVITY: ( 12 minutes): Activities per gid below to improve functional movement related mobility, strength and balance to improve neuro-muscular carryover to daily functional activities for improving patient's quality of life. Required visual, verbal and manual cues to promote proper body alignment and promote proper body posture/mechanics. Progressed resistance and complexity of movement as indicated. Date:  7/15/2021 Date:  7/19/2021 Date:  7/21/2021 Date:  7/26/2021 Date:  7/29/2021   Activity/Exercise Parameters Parameters Parameters     Lateral Walk 3x20' red 3x20'orange 4x20'orange 4x20'purple 4x20'purple   Monster Walk 3x20' red 3x20' orange 4x20'orange 4x20'purple 4x20'purple   [employer]  3 laps 4 laps 2 15# 4 laps 2 20#    Agility Ladder         4min                                                   MANUAL THERAPY: (0 minutes): Joint mobilization, Soft tissue mobilization was utilized and necessary because of the patient's restricted joint motion and restricted motion of soft tissue mobility. Date  7/29/2021    Technique Used Grade  Level # Time(s) Effect while being performed          Patella 4 way II III Right 0min Improved Mobility          PROM  Right LE 0min Improved ROM          Tissue Mobilization                           HEP Log Date 1.    7/29/2021   2.  7/29/2021   3. 7/29/2021   4.    5.           [employer] Portal  Treatment/Session Summary:    Response to Treatment: Patient responded well to light plyo, improved valgus control. Communication/Consultation:  Review posture   Equipment provided today: Not today   Recommendations/Intent for next treatment session:   Next visit will focus on Manual Therapy Core Stability Quad strengthening Hip strengthening soft tissue mobilization.          Treatment Plan of Care Effective Dates: 7/12/2021 TO 9/11/2021 (60 days).   Frequency/Duration: 2 times a week for 60 Days             Total Treatment Billable Duration:   42 Rx  PT Patient Time In/Time Out  Time In: 0725  Time Out: 1600 [facility], PT    Future Appointments   Date Time Provider Gary Munoz   8/2/2021  7:30 AM Reginold Brandon, PT [facility] [place] [facility]   8/5/2021  7:30 AM Reginold Brandon, PT [facility]   8/9/2021  7:30 AM Reginold Brandon, PT [facility]   8/12/2021  7:30 AM Reginold Brandon, PT [facility]   8/16/2021  7:30 AM Reginold Brandon, PT [facility]   8/19/2021  7:30 AM Reginold Brandon, PT [facility]   8/23/2021  7:30 AM Reginold Brandon, PT [facility]   8/25/2021  7:30 AM Reginold Brandon, PT [facility]

## 2021-08-02 ENCOUNTER — HOSPITAL ENCOUNTER (OUTPATIENT)
Dept: PHYSICAL THERAPY | Age: 46
Discharge: HOME | End: 2021-08-02
Attending: ORTHOPAEDIC SURGERY
Payer: COMMERCIAL

## 2021-08-02 NOTE — PROGRESS NOTES
Devang Chery  : 1975  Primary: [facility] Hmo/c*  Secondary:  [address] [place]  at . [address]  [address] [place] [address], [address]  [phone]   [phone]      PHYSICAL THERAPY    Patient unable to attend appointment today, sick.     Obed Romano, PT

## 2021-08-05 ENCOUNTER — HOSPITAL ENCOUNTER (OUTPATIENT)
Dept: PHYSICAL THERAPY | Age: 46
Discharge: HOME | End: 2021-08-05
Attending: ORTHOPAEDIC SURGERY
Payer: COMMERCIAL

## 2021-08-05 PROCEDURE — 97530 THERAPEUTIC ACTIVITIES: CPT

## 2021-08-05 PROCEDURE — 97110 THERAPEUTIC EXERCISES: CPT

## 2021-08-05 NOTE — PROGRESS NOTES
Baljit Rodriguez  : 1975  Payor: Shahzad Plunkett / Plan: [facility]SI [facility] HMO/CHOICE PLUS/POS / Product Type: HMO /  [employer] at [address] [place] [address]. Hiren Giron., [address] [facility], [address] [place] [address]  [phone]   [phone]                                                          Radha Kelly MD      OUTPATIENT PHYSICAL THERAPY: Daily Treatment Note 2021 Visit Count:  7    Tx Diagnosis:  Pain in right knee (M25.561)  Stiffness of right knee, not elsewhere classified (M25.661)  Other abnormalities of gait and mobility (R26.89)      Pre-treatment Symptoms/Complaints: Patient reports doing better just muscle soreness with last workout   Pain: Initial:1/10  Medications Last Reviewed:  2021     Post Session: 0/10   Updated Objective Findings: Equal innominate        TREATMENT:   THERAPEUTIC EXERCISE: (30 minutes):  Exercises per grid below to improve mobility, strength and balance. Required minimal visual, verbal and manual cues to promote proper body alignment and promote proper body posture. Progressed resistance and complexity of movement as indicated.      Date:  2021 Date:  2021 Date:  2021 Date:  2021 Date:  2021   Activity/Exercise Parameters       Education        TM     Nu step 8 min   Bike 8 min 6min 8 min 7min    Calf stretch 1b95axy 4j21mil 8b61img 1z35ycu 0a84dai   Hamstring stretch L 9w47zss L 5w48upw L 4h70olq L 6x93aqi    Piriformis stretch Seated 5q47iss Seated 4b76atl Seated 1r19kda Seated 0d93dnn    IT Band stretch        Dynamic stretch   2min 2min 4min   Squat stretch 8r77xvj       LAQ        Squat 3x8 15 # Abd orange  3x5 20 # Abd orange touch  3x5 20# box 3x8 20# box blue abd band   3 way hip swing Static 4n24asq each Squat reach pad 2x5 each  Opp slide 3x5 each Opp slide 3x5 each   Curtsy squat 3x8 each  Rings 3x8 each     Walking Lunge  6x20' 6x20'  2 10# 6x20'  2 10# 2x20'  2 15#   Shuttle  3x10 100# 3x10 125# RDL   2x10 5# 2x10 5# 2x10 7#   Lateral shuffle    4x30'     Bosu      Runner step 30x         THERAPEUTIC ACTIVITY: ( 10 minutes): Activities per gid below to improve functional movement related mobility, strength and balance to improve neuro-muscular carryover to daily functional activities for improving patient's quality of life. Required visual, verbal and manual cues to promote proper body alignment and promote proper body posture/mechanics. Progressed resistance and complexity of movement as indicated. Date:  7/19/2021 Date:  7/21/2021 Date:  7/26/2021 Date:  7/29/2021 Date:  8/5/2021   Activity/Exercise Parameters Parameters      Lateral Walk 3x20'orange 4x20'orange 4x20'purple 4x20'purple 3x20' blue   Monster Walk 3x20' orange 4x20'orange 4x20'purple 4x20'purple 3x20' blue   Triage 3 laps 4 laps 2 15# 4 laps 2 20#     Agility Ladder       4min    Light Plyo         Dbl leg square jump 2x5 laps                                     MANUAL THERAPY: (0 minutes): Joint mobilization, Soft tissue mobilization was utilized and necessary because of the patient's restricted joint motion and restricted motion of soft tissue mobility. Date  8/5/2021    Technique Used Grade  Level # Time(s) Effect while being performed          Patella 4 way II III Right 0min Improved Mobility          PROM  Right LE 0min Improved ROM          Tissue Mobilization                           HEP Log Date 1.    8/5/2021   2.  8/5/2021   3. 8/5/2021   4.    5.           [employer] Portal  Treatment/Session Summary:    Response to Treatment: Patient had no reported symptoms with increase   Communication/Consultation:  Review posture   Equipment provided today: Not today   Recommendations/Intent for next treatment session:   Next visit will focus on Manual Therapy Core Stability Quad strengthening Hip strengthening soft tissue mobilization.          Treatment Plan of Care Effective Dates: 7/12/2021 TO 9/11/2021 (60 days).   Frequency/Duration: 2 times a week for 60 Days             Total Treatment Billable Duration:   40 Rx  PT Patient Time In/Time Out  Time In: 1168  Time Out: 402 [facility], PT    Future Appointments   Date Time Provider Gary Munoz   8/9/2021  7:30 AM Umu Otilia, PT [facility]   8/12/2021  7:30 AM [facility]   8/16/2021  7:30 AM Umu Elks, PT [facility]   8/19/2021  7:30 AM Umu Elks, PT [facility]   8/23/2021  7:30 AM Umu Elks, PT [facility]   8/25/2021  7:30 AM Umu Elks, PT [facility]

## 2021-08-09 ENCOUNTER — HOSPITAL ENCOUNTER (OUTPATIENT)
Dept: PHYSICAL THERAPY | Age: 46
Discharge: HOME | End: 2021-08-09
Attending: ORTHOPAEDIC SURGERY
Payer: COMMERCIAL

## 2021-08-12 ENCOUNTER — APPOINTMENT (OUTPATIENT)
Dept: PHYSICAL THERAPY | Age: 46
End: 2021-08-12
Attending: ORTHOPAEDIC SURGERY
Payer: COMMERCIAL

## 2021-08-16 ENCOUNTER — APPOINTMENT (OUTPATIENT)
Dept: PHYSICAL THERAPY | Age: 46
End: 2021-08-16
Attending: ORTHOPAEDIC SURGERY
Payer: COMMERCIAL

## 2021-08-18 NOTE — THERAPY DISCHARGE
Ravi Rios  : 1975  Primary: [address] [facility]  Secondary:  [address]  at . [address]  [address] [facility], [address] [facility] [address]  [phone]   [phone]         OUTPATIENT PHYSICAL THERAPY:Discontinuation Summary 2021    ICD-10: Treatment Diagnosis:  Pain in right knee (M25.561)  Stiffness of right knee, not elsewhere classified (M25.661)  Other abnormalities of gait and mobility (R26.89)          Precautions/Allergies:   Biaxin [clarithromycin], Darvocet a500 [propoxyphene n-acetaminophen], Metronidazole hcl, and Sulfa (sulfonamide antibiotics)   Fall Risk Score: 0 (? 5 = High Risk)  MD Orders: Eval and Treat  MEDICAL/REFERRING DIAGNOSIS:  Right knee pain, unspecified chronicity   DATE OF ONSET: Approximately May 2021  REFERRING PHYSICIAN: Venkat Aparicio MD  RETURN PHYSICIAN APPOINTMENT: 2021     INITIAL ASSESSMENT:  Ms. Eder Smart presents to physical therapy with decreased strength, ROM, joint mobility, functional mobility. These S/S are consistent with right knee pain causing gait disturbance and limiting weightbearing activity. Patient will benefit from skilled physical therapy for manual therapeutic techniques (as appropriate), therapeutic exercises and activities, balance and comprehensive home exercises program to address current impairments and functional limitations. Ravi Rios will benefit from skilled PT (medically necessary) in order to address above deficits affecting participation in basic ADLs and overall functional tolerance. DISCONTINUATION: Ravi Rios has been seen in physical therapy from 2021 to 2021 for 7 visits. Treatment has been discontinued at this time due to patient stating that MD was pleased with progress and was ok to continue with HEP only. The below goals were met prior to discontinuation. Some goals were not met due to unable to reassess.    Thank you for this referral.       PROBLEM LIST (Impacting functional limitations):   1. Decreased Strength  2. Decreased ADL/Functional Activities  3. Decreased Transfer Abilities  4. Decreased Ambulation Ability/Technique  5. Decreased Balance  6. Increased Pain  7. Decreased Joint mobility/Flexibility   8. Decreased Activity Tolerance  9. Decreased [place] with Home Exercise Program INTERVENTIONS PLANNED:   1. Balance Exercise  2. Bed Mobility  3. Cold  4. Cryotherapy  5. Family Education  6. Gait Training  7. Heat  8. Home Exercise Program (HEP)  9. Manual Therapy  10. Neuromuscular Re-education/Strengthening  11. Range of Motion (ROM)  12. Therapeutic Activites  13. Therapeutic Exercise/Strengthening  14. Transfer Training  15. Ultrasound (US)  16. Vasopneumatic Compression  17. Aquatic Therapy          TREATMENT PLAN:  Effective Dates: 7/12/2021 TO 9/11/2021 (60 days). Frequency/Duration: 2 times a week for 60 Days    GOALS: (Goals have been discussed and agreed upon with patient.)   Short-Term Goals~4 weeks  Goal Met   1. Anjelica Ding will be independent with HEP for strength and ROM 1. [x] Date:  8/5/2021   2. Anjelica Ding will tolerate manual therapy/joint mobilizations to increase knee flexion ROM so pt can ambulate stairs and walk with a more normalized gait pattern. 2.  [x] Date:  8/5/2021   3. Anjelica Ding will participate in LE strengthening exercises for hip, knee, ankle with weight as appropriate for 3 sets of 10. 3.  [x] Date:  8/5/2021   4. Anjelica Ding will tolerate scar massage as appropriate to improve tissue mobility with patient to perform independently after education 4. [x] Date:  8/5/2021   5. Anjelica Ding will participate in static and dynamic balance activities for 5 minutes to help improve proprioception and decrease risk of falls 5. [x] Date:  8/5/2021   6. Anjelica Ding to increase lower extremity functional scale by 10 points to show improvement in areas of difficulty 6. [] Date:   7.  Anjelica Ding will demonstrate Right knee flexion >= 140 degrees to improve functional mobility and tolerance of ADLs. 7.  [x] Date:  8/5/2021   8. Flakito Davis will demonstrate Right knee extension >= 0 degrees to improve functional mobility and tolerance of ADLs 8. [x] Date:  8/5/2021   9. Flakito Davis will improve MMT Right LE to >=4+/5 to improve current level of independence and community reintegration. 9.  [x] Date:  8/5/2021         Long Term Goals~8 weeks Goal Met   1. Flakito Davis will be independent in HEP of stretching and strengthening 1. [x] Date:  8/5/2021   2. Flakito Davis will be able to perform sit to stand transfers independently with increased knee flexion and decreased use of upper extremities 2. [x] Date:  8/5/2021   3. Flakito Davis will ascend/descend 12 steps with reciprocal gait pattern and rail 3. [x] Date:  8/5/2021   4. Flakito Davis to increase lower extremity functional scale by 10 points to show improvement in areas of difficulty  4. [] Date:                 Outcome Measure: Tool Used: Lower Extremity Functional Scale (LEFS)    Score:  Initial: 68/80 Most Recent: X/80 (Date: -- )   Interpretation of Score: 20 questions each scored on a 5 point scale with 0 representing \"extreme difficulty or unable to perform\" and 4 representing \"no difficulty\". The lower the score, the greater the functional disability. 80/80 represents no disability. Minimal detectable change is 9 points. Reason for Services/Other Comments:   Flakito Davis has been seen in physical therapy from 7/12/2021 to 8/9/2021 for 7 visits. Treatment has been discontinued at this time due to patient stating that MD was pleased with progress and was ok to continue with HEP only. The above goals were met prior to discontinuation. Some goals were not met due to unable to reassess.    Thank you for this referral.           Regarding Chrystine Punter therapy, I certify that the treatment plan above will be carried out by a therapist or under their direction.   Thank you for this referral,  Saroj Oconnor PT     Referring Physician Signature: Sweetie Boucher MD  No Signature required

## 2021-08-19 ENCOUNTER — APPOINTMENT (OUTPATIENT)
Dept: PHYSICAL THERAPY | Age: 46
End: 2021-08-19
Attending: ORTHOPAEDIC SURGERY
Payer: COMMERCIAL

## 2021-08-23 ENCOUNTER — APPOINTMENT (OUTPATIENT)
Dept: PHYSICAL THERAPY | Age: 46
End: 2021-08-23
Attending: ORTHOPAEDIC SURGERY
Payer: COMMERCIAL

## 2021-08-25 ENCOUNTER — APPOINTMENT (OUTPATIENT)
Dept: PHYSICAL THERAPY | Age: 46
End: 2021-08-25
Attending: ORTHOPAEDIC SURGERY
Payer: COMMERCIAL

## 2021-10-13 ENCOUNTER — TRANSCRIBE ORDER (OUTPATIENT)
Dept: SCHEDULING | Age: 46
End: 2021-10-13

## 2021-10-13 DIAGNOSIS — Z12.31 VISIT FOR SCREENING MAMMOGRAM: Primary | ICD-10-CM

## 2021-11-17 ENCOUNTER — HOSPITAL ENCOUNTER (OUTPATIENT)
Dept: MAMMOGRAPHY | Age: 46
Discharge: HOME | End: 2021-11-17
Attending: NURSE PRACTITIONER
Payer: COMMERCIAL

## 2021-11-17 DIAGNOSIS — Z12.31 VISIT FOR SCREENING MAMMOGRAM: ICD-10-CM

## 2021-11-17 PROCEDURE — 77063 BREAST TOMOSYNTHESIS BI: CPT

## 2021-11-18 NOTE — PROGRESS NOTES
Mammogram is normal. Does have a cyst upper left breast which has been seen on previous ultrasound and mammograms. Repeat in one year. Please call patient with results.   Thanks, Jocy Abdullahi

## 2022-09-01 ENCOUNTER — OFFICE VISIT (OUTPATIENT)
Dept: FAMILY MEDICINE CLINIC | Facility: CLINIC | Age: 47
End: 2022-09-01
Payer: COMMERCIAL

## 2022-09-01 VITALS
HEIGHT: 65 IN | TEMPERATURE: 98.1 F | OXYGEN SATURATION: 98 % | DIASTOLIC BLOOD PRESSURE: 72 MMHG | HEART RATE: 84 BPM | BODY MASS INDEX: 29.49 KG/M2 | WEIGHT: 177 LBS | RESPIRATION RATE: 16 BRPM | SYSTOLIC BLOOD PRESSURE: 118 MMHG

## 2022-09-01 DIAGNOSIS — Z00.00 PHYSICAL EXAM, ANNUAL: Primary | ICD-10-CM

## 2022-09-01 DIAGNOSIS — D50.0 IRON DEFICIENCY ANEMIA DUE TO CHRONIC BLOOD LOSS: ICD-10-CM

## 2022-09-01 PROCEDURE — 99396 PREV VISIT EST AGE 40-64: CPT | Performed by: NURSE PRACTITIONER

## 2022-09-01 RX ORDER — RIVAROXABAN 10 MG/1
TABLET, FILM COATED ORAL
COMMUNITY
Start: 2022-08-18

## 2022-09-01 ASSESSMENT — PATIENT HEALTH QUESTIONNAIRE - PHQ9
2. FEELING DOWN, DEPRESSED OR HOPELESS: 0
1. LITTLE INTEREST OR PLEASURE IN DOING THINGS: 0
SUM OF ALL RESPONSES TO PHQ9 QUESTIONS 1 & 2: 0
SUM OF ALL RESPONSES TO PHQ QUESTIONS 1-9: 0

## 2022-09-01 ASSESSMENT — ENCOUNTER SYMPTOMS
VOMITING: 0
CONSTIPATION: 0
ABDOMINAL PAIN: 0
RHINORRHEA: 0
EYE PAIN: 0
DIARRHEA: 0
CHEST TIGHTNESS: 0
WHEEZING: 0
EYE DISCHARGE: 0
BLOOD IN STOOL: 0
SHORTNESS OF BREATH: 0
COUGH: 0

## 2022-09-01 NOTE — Clinical Note
Anemia from bleeding fibroid most likely. HGB 7.7. Needs iron infusion at the [facility]. Is on blood thinner from previus DVT. Do we still complete form for infusion center?   Gregory High

## 2022-09-01 NOTE — PROGRESS NOTES
Stephany Mills (:  1975) is a 51 y.o. female,Established patient, here for evaluation of the following chief complaint(s): Annual Exam         ASSESSMENT/PLAN:  1. Physical exam, annual  -     CBC with Auto Differential; Future  -     Lipid Panel; Future  -     Comprehensive Metabolic Panel; Future  -     TSH; Future  -     Hemoglobin A1C; Future    Anticipatory guidance for age-seatbelts, avoid cell phone use in car (may use hands free), no texting while driving, avoid social drugs and tobacco, limit alcohol, fall safety, personal safety. Encourage healthy diet and exercise daily. BMI Counseling:  Due to this patient's BMI, I have provided counseling regarding nutrition and physical activity. Reviewed preventative care today with patient. Return in about 1 year (around 2023) for CPE. Subjective   SUBJECTIVE/OBJECTIVE:  Physical exam:  recently seen by GYN for her pap. Up to date on mammogram.  Plans to do her next colonoscopy at 47. Strong family history of diabetes. Blood sugar was slightly elevated last year. Exercises 5 days a week at the gym. Was losing weight but then noticed her weight has increased some since her daughter went back to college. Is hoping tog et down around 160. Still having periods but they are more irregular and vary in duration. Had covid in 2020. Is vaccinated. Working from home. Has been working at home prior to civd so this is not new for her.         Allergies   Allergen Reactions    Clarithromycin Hives    Metronidazole Hives    Propoxyphene Hives    Sulfa Antibiotics Hives     Current Outpatient Medications   Medication Sig    XARELTO 10 MG TABS tablet TAKE 1 TABLET BY MOUTH DAILY WITH DINNER    ferrous sulfate (SLOW FE) 142 (45 Fe) MG extended release tablet Take 1 tablet by mouth daily    fexofenadine-pseudoephedrine (ALLEGRA-D 24HR) 180-240 MG per extended release tablet Take 1 tablet by mouth daily     No current facility-administered medications for this visit. Review of Systems   Constitutional:  Negative for fatigue and fever. HENT:  Negative for congestion, hearing loss, postnasal drip and rhinorrhea. Eyes:  Negative for pain, discharge and visual disturbance. Respiratory:  Negative for cough, chest tightness, shortness of breath and wheezing. Cardiovascular:  Negative for chest pain, palpitations and leg swelling. Gastrointestinal:  Negative for abdominal pain, blood in stool, constipation, diarrhea and vomiting. Genitourinary:  Negative for difficulty urinating, frequency and urgency. Musculoskeletal:  Negative for arthralgias, gait problem and myalgias. Skin:  Negative for rash. Neurological:  Negative for dizziness, tremors, seizures and headaches. Psychiatric/Behavioral:  Negative for decreased concentration and sleep disturbance. The patient is not nervous/anxious. /72   Pulse 84   Temp 98.1 °F (36.7 °C) (Tympanic)   Resp 16   Ht 5' 5\" (1.651 m)   Wt 177 lb (80.3 kg)   LMP 08/14/2022   SpO2 98%   BMI 29.45 kg/m²       Objective   Physical Exam  Constitutional:       Appearance: Normal appearance. HENT:      Head: Normocephalic and atraumatic. Right Ear: Tympanic membrane, ear canal and external ear normal.      Left Ear: Tympanic membrane, ear canal and external ear normal.      Nose: Nose normal.      Mouth/Throat:      Mouth: Mucous membranes are moist.   Eyes:      Extraocular Movements: Extraocular movements intact. Conjunctiva/sclera: Conjunctivae normal.      Pupils: Pupils are equal, round, and reactive to light. Cardiovascular:      Rate and Rhythm: Normal rate and regular rhythm. Pulses: Normal pulses. Heart sounds: Normal heart sounds. Pulmonary:      Effort: Pulmonary effort is normal.      Breath sounds: Normal breath sounds. Abdominal:      General: Bowel sounds are normal.   Musculoskeletal:         General: Normal range of motion.       Cervical back: Normal range of motion. Skin:     General: Skin is warm and dry. Neurological:      General: No focal deficit present. Mental Status: She is alert and oriented to person, place, and time. Psychiatric:         Mood and Affect: Mood normal.         Behavior: Behavior normal.         Thought Content: Thought content normal.         Judgment: Judgment normal.      PHQ-9 Total Score: 0 (9/1/2022  2:12 PM)  Body mass index is 29.45 kg/m². On this date 9/1/2022 I have spent 40 minutes reviewing previous notes, test results and face to face with the patient discussing the diagnosis and importance of compliance with the treatment plan as well as documenting on the day of the visit. An electronic signature was used to authenticate this note.     --VIJAY Vasquez - CNP

## 2022-09-02 LAB
ALBUMIN SERPL-MCNC: 3.5 G/DL (ref 3.5–5)
ALBUMIN/GLOB SERPL: 0.9 {RATIO} (ref 1.2–3.5)
ALP SERPL-CCNC: 58 U/L (ref 50–136)
ALT SERPL-CCNC: 11 U/L (ref 12–65)
ANION GAP SERPL CALC-SCNC: 7 MMOL/L (ref 4–13)
AST SERPL-CCNC: 10 U/L (ref 15–37)
BASOPHILS # BLD: 0 K/UL (ref 0–0.2)
BASOPHILS NFR BLD: 1 % (ref 0–2)
BILIRUB SERPL-MCNC: 0.5 MG/DL (ref 0.2–1.1)
BUN SERPL-MCNC: 7 MG/DL (ref 6–23)
CALCIUM SERPL-MCNC: 8.8 MG/DL (ref 8.3–10.4)
CHLORIDE SERPL-SCNC: 107 MMOL/L (ref 101–110)
CHOLEST SERPL-MCNC: 218 MG/DL
CO2 SERPL-SCNC: 24 MMOL/L (ref 21–32)
CREAT SERPL-MCNC: 0.8 MG/DL (ref 0.6–1)
DIFFERENTIAL METHOD BLD: ABNORMAL
EOSINOPHIL # BLD: 0.1 K/UL (ref 0–0.8)
EOSINOPHIL NFR BLD: 1 % (ref 0.5–7.8)
ERYTHROCYTE [DISTWIDTH] IN BLOOD BY AUTOMATED COUNT: 19.3 % (ref 11.9–14.6)
EST. AVERAGE GLUCOSE BLD GHB EST-MCNC: 117 MG/DL
GLOBULIN SER CALC-MCNC: 3.8 G/DL (ref 2.3–3.5)
GLUCOSE SERPL-MCNC: 89 MG/DL (ref 65–100)
HBA1C MFR BLD: 5.7 % (ref 4.8–5.6)
HCT VFR BLD AUTO: 26 % (ref 35.8–46.3)
HDLC SERPL-MCNC: 72 MG/DL (ref 40–60)
HDLC SERPL: 3 {RATIO}
HGB BLD-MCNC: 7.3 G/DL (ref 11.7–15.4)
IMM GRANULOCYTES # BLD AUTO: 0 K/UL (ref 0–0.5)
IMM GRANULOCYTES NFR BLD AUTO: 0 % (ref 0–5)
LDLC SERPL CALC-MCNC: 124.2 MG/DL
LYMPHOCYTES # BLD: 2.5 K/UL (ref 0.5–4.6)
LYMPHOCYTES NFR BLD: 43 % (ref 13–44)
MCH RBC QN AUTO: 20.7 PG (ref 26.1–32.9)
MCHC RBC AUTO-ENTMCNC: 28.1 G/DL (ref 31.4–35)
MCV RBC AUTO: 73.7 FL (ref 79.6–97.8)
MONOCYTES # BLD: 0.5 K/UL (ref 0.1–1.3)
MONOCYTES NFR BLD: 9 % (ref 4–12)
NEUTS SEG # BLD: 2.7 K/UL (ref 1.7–8.2)
NEUTS SEG NFR BLD: 46 % (ref 43–78)
NRBC # BLD: 0 K/UL (ref 0–0.2)
PLATELET # BLD AUTO: 387 K/UL (ref 150–450)
PMV BLD AUTO: 10.8 FL (ref 9.4–12.3)
POTASSIUM SERPL-SCNC: 3.9 MMOL/L (ref 3.5–5.1)
PROT SERPL-MCNC: 7.3 G/DL (ref 6.3–8.2)
RBC # BLD AUTO: 3.53 M/UL (ref 4.05–5.2)
SODIUM SERPL-SCNC: 138 MMOL/L (ref 136–145)
TRIGL SERPL-MCNC: 109 MG/DL (ref 35–150)
TSH, 3RD GENERATION: 1.85 UIU/ML (ref 0.36–3.74)
VLDLC SERPL CALC-MCNC: 21.8 MG/DL (ref 6–23)
WBC # BLD AUTO: 5.8 K/UL (ref 4.3–11.1)

## 2022-09-07 RX ORDER — FERRIC CARBOXYMALTOSE INJECTION 50 MG/ML
750 INJECTION, SOLUTION INTRAVENOUS ONCE
Qty: 15 ML | Refills: 0 | Status: SHIPPED | OUTPATIENT
Start: 2022-09-07 | End: 2022-10-24

## 2022-09-07 NOTE — PROGRESS NOTES
Anemia from bleeding fibroid most likely. HGB 7.7. [facility] Is on blood thinner from previus DVT. Do we still complete form for infusion center?   Edgardo Dire

## 2022-09-08 ENCOUNTER — TELEPHONE (OUTPATIENT)
Dept: FAMILY MEDICINE CLINIC | Facility: CLINIC | Age: 47
End: 2022-09-08

## 2022-09-08 NOTE — TELEPHONE ENCOUNTER
Yes we still send order to cancer center for infusions, you just have to enter it as a referral to them, along with what exactly the pt.  Is needing, then I will fax it

## 2022-10-24 ENCOUNTER — OFFICE VISIT (OUTPATIENT)
Dept: FAMILY MEDICINE CLINIC | Facility: CLINIC | Age: 47
End: 2022-10-24
Payer: COMMERCIAL

## 2022-10-24 VITALS
SYSTOLIC BLOOD PRESSURE: 124 MMHG | DIASTOLIC BLOOD PRESSURE: 78 MMHG | WEIGHT: 175 LBS | TEMPERATURE: 96.8 F | BODY MASS INDEX: 29.16 KG/M2 | HEIGHT: 65 IN | OXYGEN SATURATION: 98 % | HEART RATE: 64 BPM | RESPIRATION RATE: 16 BRPM

## 2022-10-24 DIAGNOSIS — D50.0 IRON DEFICIENCY ANEMIA DUE TO CHRONIC BLOOD LOSS: ICD-10-CM

## 2022-10-24 DIAGNOSIS — N30.90 CYSTITIS: ICD-10-CM

## 2022-10-24 DIAGNOSIS — R10.31 RIGHT LOWER QUADRANT ABDOMINAL PAIN: Primary | ICD-10-CM

## 2022-10-24 LAB
BACTERIA URINE, POC: ABNORMAL
BILIRUBIN, URINE, POC: NEGATIVE
BLOOD URINE, POC: NEGATIVE
CASTS URINE, POC: 0
EPI CELLS URINE, POC: NEGATIVE
GLUCOSE URINE, POC: NEGATIVE
KETONES, URINE, POC: NEGATIVE
LEUKOCYTE ESTERASE, URINE, POC: ABNORMAL
NITRITE, URINE, POC: NEGATIVE
PH, URINE, POC: 6 (ref 4.6–8)
PROTEIN,URINE, POC: NEGATIVE
RBC, URINE, POC: 0
SPECIFIC GRAVITY, URINE, POC: 1.01 (ref 1–1.03)
TRICHOMONAS URINE, POC: NEGATIVE
URINALYSIS CLARITY, POC: CLEAR
URINALYSIS COLOR, POC: YELLOW
UROBILINOGEN, POC: NORMAL
WBC, URINE, POC: 0
YEAST, URINE, POC: ABNORMAL

## 2022-10-24 PROCEDURE — 99214 OFFICE O/P EST MOD 30 MIN: CPT | Performed by: NURSE PRACTITIONER

## 2022-10-24 PROCEDURE — G8484 FLU IMMUNIZE NO ADMIN: HCPCS | Performed by: NURSE PRACTITIONER

## 2022-10-24 PROCEDURE — 81000 URINALYSIS NONAUTO W/SCOPE: CPT | Performed by: NURSE PRACTITIONER

## 2022-10-24 PROCEDURE — 1036F TOBACCO NON-USER: CPT | Performed by: NURSE PRACTITIONER

## 2022-10-24 PROCEDURE — G8419 CALC BMI OUT NRM PARAM NOF/U: HCPCS | Performed by: NURSE PRACTITIONER

## 2022-10-24 PROCEDURE — G8427 DOCREV CUR MEDS BY ELIG CLIN: HCPCS | Performed by: NURSE PRACTITIONER

## 2022-10-24 RX ORDER — FLUCONAZOLE 150 MG/1
150 TABLET ORAL WEEKLY
Qty: 2 TABLET | Refills: 0 | Status: SHIPPED | OUTPATIENT
Start: 2022-10-24

## 2022-10-24 RX ORDER — DOXYCYCLINE HYCLATE 100 MG
100 TABLET ORAL 2 TIMES DAILY
Qty: 20 TABLET | Refills: 0 | Status: SHIPPED | OUTPATIENT
Start: 2022-10-24 | End: 2022-11-03

## 2022-10-24 ASSESSMENT — PATIENT HEALTH QUESTIONNAIRE - PHQ9
1. LITTLE INTEREST OR PLEASURE IN DOING THINGS: 0
SUM OF ALL RESPONSES TO PHQ9 QUESTIONS 1 & 2: 0
SUM OF ALL RESPONSES TO PHQ QUESTIONS 1-9: 0
2. FEELING DOWN, DEPRESSED OR HOPELESS: 0
SUM OF ALL RESPONSES TO PHQ QUESTIONS 1-9: 0

## 2022-10-24 ASSESSMENT — ENCOUNTER SYMPTOMS
SHORTNESS OF BREATH: 0
ABDOMINAL DISTENTION: 0
BLOOD IN STOOL: 0
CONSTIPATION: 0
CHEST TIGHTNESS: 0
ABDOMINAL PAIN: 1
BACK PAIN: 0

## 2022-10-24 NOTE — PROGRESS NOTES
Zeb Yeager (:  1975) is a 51 y.o. female,Established patient, here for evaluation of the following chief complaint(s): Other (Pain from bloating)    Note written by Steven Sim RN,  student NP. I have examined the patient and agree with the note/plan. Henry Min         ASSESSMENT/PLAN:  1. Right lower quadrant abdominal pain  -     AMB POC URINALYSIS DIP STICK MANUAL W/ MICRO BSSC  -     CBC with Auto Differential; Future  -     Comprehensive Metabolic Panel; Future    -On microscopic analysis of urine, many bacteria present. Suspect abdominal pain could be related to treatment failure of UTI. Treat for UTI with doxycycline for better urine coverage. Follow up lab results. If no improvement in 24-48 hrs, pt instructed to call office and will order an abdominal CT scan for further evaluation. Still concerned that this could be appendicitis or ovarian cyst.      2. Iron deficiency anemia due to chronic blood loss  -     Iron; Future  -     Ferritin; Future    -follow up with lab results. Has had her iron infusion. Needs to schedule appointment with GYN to discuss options. 3. Cystitis  -     doxycycline hyclate (VIBRA-TABS) 100 MG tablet; Take 1 tablet by mouth 2 times daily for 10 days, Disp-20 tablet, R-0Normal  -     fluconazole (DIFLUCAN) 150 MG tablet; Take 1 tablet by mouth once a week, Disp-2 tablet, R-0Normal  -     Culture, Urine; Future    -On microscopic analysis of urine, many bacteria present as well as yeast. Start abx and diflucan. Follow results of culture.      Allergies   Allergen Reactions    Clarithromycin Hives    Metronidazole Hives    Propoxyphene Hives    Sulfa Antibiotics Hives     Current Outpatient Medications   Medication Sig Dispense Refill    doxycycline hyclate (VIBRA-TABS) 100 MG tablet Take 1 tablet by mouth 2 times daily for 10 days 20 tablet 0    fluconazole (DIFLUCAN) 150 MG tablet Take 1 tablet by mouth once a week 2 tablet 0    XARELTO 10 MG TABS tablet TAKE 1 TABLET BY MOUTH DAILY WITH DINNER      fexofenadine-pseudoephedrine (ALLEGRA-D 24HR) 180-240 MG per extended release tablet Take 1 tablet by mouth daily      ferrous sulfate (SLOW FE) 142 (45 Fe) MG extended release tablet Take 1 tablet by mouth daily (Patient not taking: Reported on 10/24/2022)       No current facility-administered medications for this visit. Subjective   SUBJECTIVE/OBJECTIVE:  Pt  presents to office today for abdominal pressure and bloating in the right lower quadrant starting about 2 weeks ago. She went to urgent care and was diagnosed with a UTI and received a coarse of amoxicillin which she completed with no relief of symptoms. She says she had this similar problem a few years ago related to constipation relieved by miralax so she has also been taking that for a week and having a BM every day with no relief of symptoms. She denies N/V, fever, chills, nigh sweats, or radiating pain. She is still working out 5 days a week and is not experiencing any worsening pain with movement. She still has her gallbladder, appendix, and uterus. Her LMP was 9/17/22. She has a history of uterine fibroids and her next annual ob/gyn exam is coming up in December. She has a trip planned to [facility] next week so she is hoping to get this resolved before her trip. Abdominal Pain  This is a new problem. The current episode started 1 to 4 weeks ago. The onset quality is sudden. The problem occurs constantly. The problem has been unchanged. The pain is located in the RLQ and suprapubic region. Quality: pressure and bloating not pain. The abdominal pain does not radiate. Pertinent negatives include no constipation, dysuria or frequency. Nothing aggravates the pain. The pain is relieved by Nothing. Treatments tried: amoxicillin and miralax. The treatment provided no relief. Prior workup: see orders. Her past medical history is significant for irritable bowel syndrome.  uterine fibroids Allergies   Allergen Reactions    Clarithromycin Hives    Metronidazole Hives    Propoxyphene Hives    Sulfa Antibiotics Hives     Current Outpatient Medications   Medication Sig    doxycycline hyclate (VIBRA-TABS) 100 MG tablet Take 1 tablet by mouth 2 times daily for 10 days    fluconazole (DIFLUCAN) 150 MG tablet Take 1 tablet by mouth once a week    XARELTO 10 MG TABS tablet TAKE 1 TABLET BY MOUTH DAILY WITH DINNER    fexofenadine-pseudoephedrine (ALLEGRA-D 24HR) 180-240 MG per extended release tablet Take 1 tablet by mouth daily    ferrous sulfate (SLOW FE) 142 (45 Fe) MG extended release tablet Take 1 tablet by mouth daily (Patient not taking: Reported on 10/24/2022)     No current facility-administered medications for this visit. Review of Systems   Constitutional: Negative. Respiratory:  Negative for chest tightness and shortness of breath. Cardiovascular:  Negative for palpitations and leg swelling. Gastrointestinal:  Positive for abdominal pain. Negative for abdominal distention, blood in stool and constipation. Pressure and bloating in the Right lower quadrant. Genitourinary:  Positive for urgency. Negative for dysuria, flank pain and frequency. Musculoskeletal:  Negative for back pain. Neurological:  Negative for dizziness, syncope and light-headedness. Psychiatric/Behavioral:  Negative for dysphoric mood and sleep disturbance. The patient is not nervous/anxious. /78   Pulse 64   Temp 96.8 °F (36 °C) (Temporal)   Resp 16   Ht 5' 5\" (1.651 m)   Wt 175 lb (79.4 kg)   LMP 09/22/2022   SpO2 98%   BMI 29.12 kg/m²       Objective   Physical Exam  Constitutional:       General: She is not in acute distress. Appearance: Normal appearance. She is not ill-appearing, toxic-appearing or diaphoretic. Cardiovascular:      Rate and Rhythm: Normal rate and regular rhythm. Pulses: Normal pulses. Heart sounds: Normal heart sounds.    Pulmonary: Effort: Pulmonary effort is normal.      Breath sounds: Normal breath sounds. Abdominal:      General: Abdomen is flat. Bowel sounds are normal. There is no distension. Palpations: Abdomen is soft. There is no mass. Tenderness: There is abdominal tenderness. There is no guarding or rebound. Hernia: No hernia is present. Comments: RLQ and suprapubic tenderness on light and deep palpation. Negative for rebound tenderness. Musculoskeletal:      Cervical back: Normal range of motion and neck supple. Neurological:      General: No focal deficit present. Mental Status: She is alert. Psychiatric:         Mood and Affect: Mood normal.         Behavior: Behavior normal.         Thought Content: Thought content normal.         Judgment: Judgment normal.      /78   Pulse 64   Temp 96.8 °F (36 °C) (Temporal)   Resp 16   Ht 5' 5\" (1.651 m)   Wt 175 lb (79.4 kg)   LMP 09/22/2022   SpO2 98%   BMI 29.12 kg/m²       On this date 10/24/2022 I have spent 30 minutes reviewing previous notes, test results and face to face with the patient discussing the diagnosis and importance of compliance with the treatment plan as well as documenting on the day of the visit. An electronic signature was used to authenticate this note.     --Payam Jacques

## 2022-10-25 LAB
ALBUMIN SERPL-MCNC: 3.7 G/DL (ref 3.5–5)
ALBUMIN/GLOB SERPL: 1.1 {RATIO} (ref 0.4–1.6)
ALP SERPL-CCNC: 57 U/L (ref 50–136)
ALT SERPL-CCNC: 18 U/L (ref 12–65)
ANION GAP SERPL CALC-SCNC: 6 MMOL/L (ref 2–11)
AST SERPL-CCNC: 14 U/L (ref 15–37)
BASOPHILS # BLD: 0 K/UL (ref 0–0.2)
BASOPHILS NFR BLD: 1 % (ref 0–2)
BILIRUB SERPL-MCNC: 0.5 MG/DL (ref 0.2–1.1)
BUN SERPL-MCNC: 6 MG/DL (ref 6–23)
CALCIUM SERPL-MCNC: 10.1 MG/DL (ref 8.3–10.4)
CHLORIDE SERPL-SCNC: 107 MMOL/L (ref 101–110)
CO2 SERPL-SCNC: 26 MMOL/L (ref 21–32)
CREAT SERPL-MCNC: 0.7 MG/DL (ref 0.6–1)
DIFFERENTIAL METHOD BLD: ABNORMAL
EOSINOPHIL # BLD: 0.2 K/UL (ref 0–0.8)
EOSINOPHIL NFR BLD: 3 % (ref 0.5–7.8)
ERYTHROCYTE [DISTWIDTH] IN BLOOD BY AUTOMATED COUNT: 25.3 % (ref 11.9–14.6)
FERRITIN SERPL-MCNC: 7 NG/ML (ref 8–388)
GLOBULIN SER CALC-MCNC: 3.5 G/DL (ref 2.8–4.5)
GLUCOSE SERPL-MCNC: 107 MG/DL (ref 65–100)
HCT VFR BLD AUTO: 36.4 % (ref 35.8–46.3)
HGB BLD-MCNC: 11 G/DL (ref 11.7–15.4)
IMM GRANULOCYTES # BLD AUTO: 0 K/UL (ref 0–0.5)
IMM GRANULOCYTES NFR BLD AUTO: 0 % (ref 0–5)
IRON SERPL-MCNC: 22 UG/DL (ref 35–150)
LYMPHOCYTES # BLD: 2.2 K/UL (ref 0.5–4.6)
LYMPHOCYTES NFR BLD: 43 % (ref 13–44)
MCH RBC QN AUTO: 23.8 PG (ref 26.1–32.9)
MCHC RBC AUTO-ENTMCNC: 30.2 G/DL (ref 31.4–35)
MCV RBC AUTO: 78.8 FL (ref 82–102)
MONOCYTES # BLD: 0.5 K/UL (ref 0.1–1.3)
MONOCYTES NFR BLD: 9 % (ref 4–12)
NEUTS SEG # BLD: 2.3 K/UL (ref 1.7–8.2)
NEUTS SEG NFR BLD: 44 % (ref 43–78)
NRBC # BLD: 0 K/UL (ref 0–0.2)
PLATELET # BLD AUTO: 259 K/UL (ref 150–450)
PMV BLD AUTO: 11.1 FL (ref 9.4–12.3)
POTASSIUM SERPL-SCNC: 3.9 MMOL/L (ref 3.5–5.1)
PROT SERPL-MCNC: 7.2 G/DL (ref 6.3–8.2)
RBC # BLD AUTO: 4.62 M/UL (ref 4.05–5.2)
SODIUM SERPL-SCNC: 139 MMOL/L (ref 133–143)
WBC # BLD AUTO: 5.2 K/UL (ref 4.3–11.1)

## 2022-10-27 LAB
BACTERIA SPEC CULT: NORMAL
SERVICE CMNT-IMP: NORMAL

## 2022-11-16 ENCOUNTER — TRANSCRIBE ORDERS (OUTPATIENT)
Dept: SCHEDULING | Age: 47
End: 2022-11-16

## 2022-11-16 DIAGNOSIS — Z12.31 ENCOUNTER FOR SCREENING MAMMOGRAM FOR MALIGNANT NEOPLASM OF BREAST: Primary | ICD-10-CM

## 2022-11-18 ENCOUNTER — HOSPITAL ENCOUNTER (OUTPATIENT)
Dept: MAMMOGRAPHY | Age: 47
Discharge: HOME | End: 2022-11-21
Payer: COMMERCIAL

## 2022-11-18 DIAGNOSIS — Z12.31 ENCOUNTER FOR SCREENING MAMMOGRAM FOR MALIGNANT NEOPLASM OF BREAST: ICD-10-CM

## 2022-11-18 PROCEDURE — 77067 SCR MAMMO BI INCL CAD: CPT

## 2023-05-01 ENCOUNTER — PATIENT MESSAGE (OUTPATIENT)
Dept: FAMILY MEDICINE CLINIC | Facility: CLINIC | Age: 48
End: 2023-05-01

## 2023-05-01 ENCOUNTER — NURSE ONLY (OUTPATIENT)
Dept: FAMILY MEDICINE CLINIC | Facility: CLINIC | Age: 48
End: 2023-05-01

## 2023-05-01 DIAGNOSIS — R53.82 CHRONIC FATIGUE: Primary | ICD-10-CM

## 2023-05-01 DIAGNOSIS — R53.82 CHRONIC FATIGUE: ICD-10-CM

## 2023-05-01 NOTE — TELEPHONE ENCOUNTER
From: Jeovanny Sabillon  To: Alba Garcia  Sent: 5/1/2023 11:23 AM EDT  Subject: TSH testing    Do you think it would be best to have my thyroid tested before our visit? I went to a nutritionist who believes Im have issues with my thyroid that is causing my insomnia and anxiety.  He gave me T150 as a supplement

## 2023-05-02 ENCOUNTER — TELEPHONE (OUTPATIENT)
Dept: FAMILY MEDICINE CLINIC | Facility: CLINIC | Age: 48
End: 2023-05-02

## 2023-05-02 LAB
T3 SERPL-MCNC: 0.96 NG/ML (ref 0.6–1.81)
T4 FREE SERPL-MCNC: 0.9 NG/DL (ref 0.78–1.46)
TSH, 3RD GENERATION: 1.65 UIU/ML (ref 0.36–3.74)

## 2023-05-03 ENCOUNTER — TELEMEDICINE (OUTPATIENT)
Dept: FAMILY MEDICINE CLINIC | Facility: CLINIC | Age: 48
End: 2023-05-03
Payer: COMMERCIAL

## 2023-05-03 DIAGNOSIS — R61 NIGHT SWEATS: ICD-10-CM

## 2023-05-03 DIAGNOSIS — G47.9 SLEEP DIFFICULTIES: Primary | ICD-10-CM

## 2023-05-03 LAB — T3FREE SERPL-MCNC: 2.6 PG/ML (ref 2–4.4)

## 2023-05-03 PROCEDURE — G8419 CALC BMI OUT NRM PARAM NOF/U: HCPCS | Performed by: NURSE PRACTITIONER

## 2023-05-03 PROCEDURE — 99214 OFFICE O/P EST MOD 30 MIN: CPT | Performed by: NURSE PRACTITIONER

## 2023-05-03 PROCEDURE — G8427 DOCREV CUR MEDS BY ELIG CLIN: HCPCS | Performed by: NURSE PRACTITIONER

## 2023-05-03 PROCEDURE — 1036F TOBACCO NON-USER: CPT | Performed by: NURSE PRACTITIONER

## 2023-05-03 RX ORDER — TRAZODONE HYDROCHLORIDE 50 MG/1
50 TABLET ORAL NIGHTLY
Qty: 90 TABLET | Refills: 0 | Status: SHIPPED | OUTPATIENT
Start: 2023-05-03

## 2023-05-03 SDOH — ECONOMIC STABILITY: INCOME INSECURITY: HOW HARD IS IT FOR YOU TO PAY FOR THE VERY BASICS LIKE FOOD, HOUSING, MEDICAL CARE, AND HEATING?: NOT HARD AT ALL

## 2023-05-03 SDOH — ECONOMIC STABILITY: FOOD INSECURITY: WITHIN THE PAST 12 MONTHS, YOU WORRIED THAT YOUR FOOD WOULD RUN OUT BEFORE YOU GOT MONEY TO BUY MORE.: NEVER TRUE

## 2023-05-03 SDOH — ECONOMIC STABILITY: HOUSING INSECURITY
IN THE LAST 12 MONTHS, WAS THERE A TIME WHEN YOU DID NOT HAVE A STEADY PLACE TO SLEEP OR SLEPT IN A SHELTER (INCLUDING NOW)?: NO

## 2023-05-03 SDOH — ECONOMIC STABILITY: FOOD INSECURITY: WITHIN THE PAST 12 MONTHS, THE FOOD YOU BOUGHT JUST DIDN'T LAST AND YOU DIDN'T HAVE MONEY TO GET MORE.: NEVER TRUE

## 2023-05-03 ASSESSMENT — PATIENT HEALTH QUESTIONNAIRE - PHQ9
2. FEELING DOWN, DEPRESSED OR HOPELESS: 1
1. LITTLE INTEREST OR PLEASURE IN DOING THINGS: 0
SUM OF ALL RESPONSES TO PHQ QUESTIONS 1-9: 1
SUM OF ALL RESPONSES TO PHQ9 QUESTIONS 1 & 2: 1
SUM OF ALL RESPONSES TO PHQ QUESTIONS 1-9: 1

## 2023-05-03 ASSESSMENT — ENCOUNTER SYMPTOMS
VOMITING: 0
COUGH: 0
EYE DISCHARGE: 0
CHEST TIGHTNESS: 0
BLOOD IN STOOL: 0
SHORTNESS OF BREATH: 0
CONSTIPATION: 0
RHINORRHEA: 0
EYE PAIN: 0
DIARRHEA: 0
ABDOMINAL PAIN: 0
WHEEZING: 0

## 2023-05-03 NOTE — PROGRESS NOTES
insomnia, irritability, nervous/anxious behavior, panic and restlessness. Patient reports no chest pain, decreased concentration, dizziness, excessive worry, palpitations or shortness of breath. Symptoms occur most days. The severity of symptoms is moderate. Nothing aggravates the symptoms. The quality of sleep is poor. Nighttime awakenings: several.     There are no known risk factors. Past treatments include nothing. Allergies   Allergen Reactions    Clarithromycin Hives    Metronidazole Hives    Propoxyphene Hives    Sulfa Antibiotics Hives     Current Outpatient Medications   Medication Sig    traZODone (DESYREL) 50 MG tablet Take 1 tablet by mouth nightly    XARELTO 10 MG TABS tablet TAKE 1 TABLET BY MOUTH DAILY WITH DINNER    ferrous sulfate (SLOW FE) 142 (45 Fe) MG extended release tablet Take 142 mg by mouth daily    fexofenadine-pseudoephedrine (ALLEGRA-D 24HR) 180-240 MG per extended release tablet Take 1 tablet by mouth daily     No current facility-administered medications for this visit. Review of Systems   Constitutional:  Positive for irritability. Negative for fatigue and fever. HENT:  Negative for congestion, hearing loss, postnasal drip and rhinorrhea. Eyes:  Negative for pain, discharge and visual disturbance. Respiratory:  Negative for cough, chest tightness, shortness of breath and wheezing. Cardiovascular:  Negative for chest pain, palpitations and leg swelling. Gastrointestinal:  Negative for abdominal pain, blood in stool, constipation, diarrhea and vomiting. Endocrine:        Periods are irregular. Has had more anxiety than usual.  Not sleeping well. Having night sweats. Feels hot. Genitourinary:  Negative for difficulty urinating, frequency and urgency. Musculoskeletal:  Negative for arthralgias, gait problem and myalgias. Skin:  Negative for rash. Neurological:  Negative for dizziness, tremors, seizures and headaches.    Psychiatric/Behavioral:  Negative

## 2023-05-04 ENCOUNTER — NURSE ONLY (OUTPATIENT)
Dept: FAMILY MEDICINE CLINIC | Facility: CLINIC | Age: 48
End: 2023-05-04

## 2023-05-04 DIAGNOSIS — R61 NIGHT SWEATS: ICD-10-CM

## 2023-05-04 DIAGNOSIS — G47.9 SLEEP DIFFICULTIES: ICD-10-CM

## 2023-05-04 LAB
FSH SERPL-ACNC: 20.6 MIU/ML
LH SERPL-ACNC: 18.7 MIU/ML

## 2023-05-05 LAB
ESTRADIOL SERPL-MCNC: 21.8 PG/ML
ESTRONE SERPL-MCNC: 25 PG/ML

## 2023-09-07 ENCOUNTER — OFFICE VISIT (OUTPATIENT)
Dept: FAMILY MEDICINE CLINIC | Facility: CLINIC | Age: 48
End: 2023-09-07
Payer: COMMERCIAL

## 2023-09-07 VITALS
SYSTOLIC BLOOD PRESSURE: 118 MMHG | WEIGHT: 179 LBS | HEIGHT: 65 IN | BODY MASS INDEX: 29.82 KG/M2 | TEMPERATURE: 97.3 F | DIASTOLIC BLOOD PRESSURE: 76 MMHG | HEART RATE: 69 BPM | OXYGEN SATURATION: 97 % | RESPIRATION RATE: 18 BRPM

## 2023-09-07 DIAGNOSIS — R61 NIGHT SWEATS: ICD-10-CM

## 2023-09-07 DIAGNOSIS — Z00.00 PHYSICAL EXAM, ANNUAL: Primary | ICD-10-CM

## 2023-09-07 LAB
ALBUMIN SERPL-MCNC: 3.5 G/DL (ref 3.5–5)
ALBUMIN/GLOB SERPL: 0.9 (ref 0.4–1.6)
ALP SERPL-CCNC: 57 U/L (ref 50–136)
ALT SERPL-CCNC: 19 U/L (ref 12–65)
ANION GAP SERPL CALC-SCNC: 5 MMOL/L (ref 2–11)
AST SERPL-CCNC: 17 U/L (ref 15–37)
BASOPHILS # BLD: 0 K/UL (ref 0–0.2)
BASOPHILS NFR BLD: 1 % (ref 0–2)
BILIRUB SERPL-MCNC: 0.4 MG/DL (ref 0.2–1.1)
BUN SERPL-MCNC: 8 MG/DL (ref 6–23)
CALCIUM SERPL-MCNC: 9.3 MG/DL (ref 8.3–10.4)
CHLORIDE SERPL-SCNC: 108 MMOL/L (ref 101–110)
CHOLEST SERPL-MCNC: 228 MG/DL
CO2 SERPL-SCNC: 26 MMOL/L (ref 21–32)
CREAT SERPL-MCNC: 0.9 MG/DL (ref 0.6–1)
DIFFERENTIAL METHOD BLD: ABNORMAL
EOSINOPHIL # BLD: 0.2 K/UL (ref 0–0.8)
EOSINOPHIL NFR BLD: 3 % (ref 0.5–7.8)
ERYTHROCYTE [DISTWIDTH] IN BLOOD BY AUTOMATED COUNT: 21.3 % (ref 11.9–14.6)
FSH SERPL-ACNC: 11.6 MIU/ML
GLOBULIN SER CALC-MCNC: 4 G/DL (ref 2.8–4.5)
GLUCOSE SERPL-MCNC: 95 MG/DL (ref 65–100)
HCT VFR BLD AUTO: 35.3 % (ref 35.8–46.3)
HDLC SERPL-MCNC: 69 MG/DL (ref 40–60)
HDLC SERPL: 3.3
HGB BLD-MCNC: 10.9 G/DL (ref 11.7–15.4)
IMM GRANULOCYTES # BLD AUTO: 0 K/UL (ref 0–0.5)
IMM GRANULOCYTES NFR BLD AUTO: 0 % (ref 0–5)
LDLC SERPL CALC-MCNC: 137 MG/DL
LH SERPL-ACNC: 17.3 MIU/ML
LYMPHOCYTES # BLD: 2.4 K/UL (ref 0.5–4.6)
LYMPHOCYTES NFR BLD: 42 % (ref 13–44)
MCH RBC QN AUTO: 25.6 PG (ref 26.1–32.9)
MCHC RBC AUTO-ENTMCNC: 30.9 G/DL (ref 31.4–35)
MCV RBC AUTO: 83.1 FL (ref 82–102)
MONOCYTES # BLD: 0.4 K/UL (ref 0.1–1.3)
MONOCYTES NFR BLD: 8 % (ref 4–12)
NEUTS SEG # BLD: 2.6 K/UL (ref 1.7–8.2)
NEUTS SEG NFR BLD: 47 % (ref 43–78)
NRBC # BLD: 0 K/UL (ref 0–0.2)
PLATELET # BLD AUTO: 300 K/UL (ref 150–450)
PMV BLD AUTO: 11 FL (ref 9.4–12.3)
POTASSIUM SERPL-SCNC: 3.8 MMOL/L (ref 3.5–5.1)
PROT SERPL-MCNC: 7.5 G/DL (ref 6.3–8.2)
RBC # BLD AUTO: 4.25 M/UL (ref 4.05–5.2)
SODIUM SERPL-SCNC: 139 MMOL/L (ref 133–143)
TRIGL SERPL-MCNC: 110 MG/DL (ref 35–150)
TSH, 3RD GENERATION: 2.1 UIU/ML (ref 0.36–3.74)
VLDLC SERPL CALC-MCNC: 22 MG/DL (ref 6–23)
WBC # BLD AUTO: 5.6 K/UL (ref 4.3–11.1)

## 2023-09-07 PROCEDURE — 99396 PREV VISIT EST AGE 40-64: CPT | Performed by: NURSE PRACTITIONER

## 2023-09-07 SDOH — ECONOMIC STABILITY: INCOME INSECURITY: HOW HARD IS IT FOR YOU TO PAY FOR THE VERY BASICS LIKE FOOD, HOUSING, MEDICAL CARE, AND HEATING?: NOT HARD AT ALL

## 2023-09-07 SDOH — ECONOMIC STABILITY: FOOD INSECURITY: WITHIN THE PAST 12 MONTHS, THE FOOD YOU BOUGHT JUST DIDN'T LAST AND YOU DIDN'T HAVE MONEY TO GET MORE.: NEVER TRUE

## 2023-09-07 SDOH — ECONOMIC STABILITY: FOOD INSECURITY: WITHIN THE PAST 12 MONTHS, YOU WORRIED THAT YOUR FOOD WOULD RUN OUT BEFORE YOU GOT MONEY TO BUY MORE.: NEVER TRUE

## 2023-09-07 ASSESSMENT — ENCOUNTER SYMPTOMS
TROUBLE SWALLOWING: 0
GASTROINTESTINAL NEGATIVE: 1
RESPIRATORY NEGATIVE: 1
ABDOMINAL DISTENTION: 0
CHEST TIGHTNESS: 0
SHORTNESS OF BREATH: 0
EYE DISCHARGE: 0
PHOTOPHOBIA: 0
EYES NEGATIVE: 1

## 2023-09-07 ASSESSMENT — PATIENT HEALTH QUESTIONNAIRE - PHQ9
SUM OF ALL RESPONSES TO PHQ QUESTIONS 1-9: 0
1. LITTLE INTEREST OR PLEASURE IN DOING THINGS: 0
2. FEELING DOWN, DEPRESSED OR HOPELESS: 0
SUM OF ALL RESPONSES TO PHQ QUESTIONS 1-9: 0
SUM OF ALL RESPONSES TO PHQ9 QUESTIONS 1 & 2: 0

## 2023-09-07 NOTE — PROGRESS NOTES
Dinah Perez (:  1975) is a 49 y.o. female,Established patient, here for evaluation of the following chief complaint(s): Annual Exam (Pt. Is here for physical and labs)    Note written by Estelle Alexis RN, student NP. I have examined the patient and agree with the note/plan.  Arturo Klein    Results for orders placed or performed in visit on 23   Luteinizing Hormone   Result Value Ref Range    LH 51.4 mIU/mL   Follicle Stimulating Hormone   Result Value Ref Range    FSH 11.6 mIU/mL   Estrogens, Fractionated   Result Value Ref Range    Estradiol 49.6 pg/mL   Hemoglobin A1C   Result Value Ref Range    Hemoglobin A1C 5.5 4.8 - 5.6 %    eAG 111 mg/dL   Lipid Panel   Result Value Ref Range    Cholesterol, Total 228 (H) <200 MG/DL    Triglycerides 110 35 - 150 MG/DL    HDL 69 (H) 40 - 60 MG/DL    LDL Calculated 137 (H) <100 MG/DL    VLDL Cholesterol Calculated 22 6.0 - 23.0 MG/DL    Chol/HDL Ratio 3.3     CBC with Auto Differential   Result Value Ref Range    WBC 5.6 4.3 - 11.1 K/uL    RBC 4.25 4.05 - 5.2 M/uL    Hemoglobin 10.9 (L) 11.7 - 15.4 g/dL    Hematocrit 35.3 (L) 35.8 - 46.3 %    MCV 83.1 82 - 102 FL    MCH 25.6 (L) 26.1 - 32.9 PG    MCHC 30.9 (L) 31.4 - 35.0 g/dL    RDW 21.3 (H) 11.9 - 14.6 %    Platelets 209 703 - 556 K/uL    MPV 11.0 9.4 - 12.3 FL    nRBC 0.00 0.0 - 0.2 K/uL    Differential Type AUTOMATED      Neutrophils % 47 43 - 78 %    Lymphocytes % 42 13 - 44 %    Monocytes % 8 4.0 - 12.0 %    Eosinophils % 3 0.5 - 7.8 %    Basophils % 1 0.0 - 2.0 %    Immature Granulocytes 0 0.0 - 5.0 %    Neutrophils Absolute 2.6 1.7 - 8.2 K/UL    Lymphocytes Absolute 2.4 0.5 - 4.6 K/UL    Monocytes Absolute 0.4 0.1 - 1.3 K/UL    Eosinophils Absolute 0.2 0.0 - 0.8 K/UL    Basophils Absolute 0.0 0.0 - 0.2 K/UL    Absolute Immature Granulocyte 0.0 0.0 - 0.5 K/UL   Comprehensive Metabolic Panel   Result Value Ref Range    Sodium 139 133 - 143 mmol/L    Potassium 3.8 3.5 - 5.1 mmol/L    Chloride 108 101 -

## 2023-09-08 LAB
EST. AVERAGE GLUCOSE BLD GHB EST-MCNC: 111 MG/DL
HBA1C MFR BLD: 5.5 % (ref 4.8–5.6)

## 2023-09-09 LAB — ESTRADIOL SERPL-MCNC: 49.6 PG/ML

## 2023-09-13 LAB
ESTRADIOL SERPL-MCNC: 49.6 PG/ML
ESTRONE SERPL-MCNC: 100 PG/ML

## 2023-10-04 ENCOUNTER — TRANSCRIBE ORDERS (OUTPATIENT)
Dept: SCHEDULING | Age: 48
End: 2023-10-04

## 2023-10-04 DIAGNOSIS — Z12.31 VISIT FOR SCREENING MAMMOGRAM: Primary | ICD-10-CM

## 2023-10-30 ENCOUNTER — TELEPHONE (OUTPATIENT)
Dept: FAMILY MEDICINE CLINIC | Facility: CLINIC | Age: 48
End: 2023-10-30

## 2023-10-30 DIAGNOSIS — N60.02 BREAST CYST, LEFT: Primary | ICD-10-CM

## 2023-10-30 NOTE — TELEPHONE ENCOUNTER
----- Message from Taylor Wagoner sent at 10/30/2023  8:31 AM EDT -----  Subject: Referral Request    Reason for referral request? Breast scan  Provider patient wants to be referred to(if known):     Provider Phone Number(if known): Additional Information for Provider?  Patient needs a scan for a cyst in   her left breast. Please have Jeana Tirado call to discuss  ---------------------------------------------------------------------------  --------------  [address] [place]    [phone]; OK to leave message on voicemail  ---------------------------------------------------------------------------  --------------

## 2023-10-30 NOTE — TELEPHONE ENCOUNTER
Spoke with patient. Mammogram and ultrasound ordered. Reviewed 2022 mammogram with her and cyst was noted right breast but patient states cyst has always been on the left outer quadrant.   Guillermo Cedeño

## 2023-11-20 ENCOUNTER — HOSPITAL ENCOUNTER (OUTPATIENT)
Dept: MAMMOGRAPHY | Age: 48
Discharge: HOME | End: 2023-11-23
Payer: COMMERCIAL

## 2023-11-20 VITALS — HEIGHT: 65 IN | BODY MASS INDEX: 29.16 KG/M2 | WEIGHT: 175 LBS

## 2023-11-20 DIAGNOSIS — N60.02 BREAST CYST, LEFT: ICD-10-CM

## 2023-11-20 PROCEDURE — 76642 ULTRASOUND BREAST LIMITED: CPT

## 2023-11-20 PROCEDURE — 77066 DX MAMMO INCL CAD BI: CPT

## 2024-06-10 ENCOUNTER — OFFICE VISIT (OUTPATIENT)
Dept: FAMILY MEDICINE CLINIC | Facility: CLINIC | Age: 49
End: 2024-06-10
Payer: COMMERCIAL

## 2024-06-10 VITALS
BODY MASS INDEX: 31.73 KG/M2 | HEART RATE: 79 BPM | HEIGHT: 63 IN | TEMPERATURE: 98.7 F | OXYGEN SATURATION: 98 % | DIASTOLIC BLOOD PRESSURE: 82 MMHG | WEIGHT: 179.1 LBS | SYSTOLIC BLOOD PRESSURE: 130 MMHG | RESPIRATION RATE: 12 BRPM

## 2024-06-10 DIAGNOSIS — L85.3 DRY SKIN: ICD-10-CM
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
## 2024-06-10 PROCEDURE — G8427 DOCREV CUR MEDS BY ELIG CLIN: HCPCS | Performed by: FAMILY MEDICINE

## 2024-06-10 PROCEDURE — 99213 OFFICE O/P EST LOW 20 MIN: CPT | Performed by: FAMILY MEDICINE

## 2024-06-10 PROCEDURE — G8417 CALC BMI ABV UP PARAM F/U: HCPCS | Performed by: FAMILY MEDICINE

## 2024-06-10 PROCEDURE — 1036F TOBACCO NON-USER: CPT | Performed by: FAMILY MEDICINE

## 2024-06-10 ASSESSMENT — ENCOUNTER SYMPTOMS
SHORTNESS OF BREATH: 0
PHOTOPHOBIA: 0
COLOR CHANGE: 0
ABDOMINAL PAIN: 0
NAUSEA: 0
COUGH: 0
WHEEZING: 0
SINUS PAIN: 0
VOMITING: 0
DIARRHEA: 0

## 2024-06-10 ASSESSMENT — PATIENT HEALTH QUESTIONNAIRE - PHQ9
1. LITTLE INTEREST OR PLEASURE IN DOING THINGS: NOT AT ALL
SUM OF ALL RESPONSES TO PHQ QUESTIONS 1-9: 0
2. FEELING DOWN, DEPRESSED OR HOPELESS: NOT AT ALL
SUM OF ALL RESPONSES TO PHQ QUESTIONS 1-9: 0
SUM OF ALL RESPONSES TO PHQ9 QUESTIONS 1 & 2: 0
SUM OF ALL RESPONSES TO PHQ9 QUESTIONS 1 & 2: 0
1. LITTLE INTEREST OR PLEASURE IN DOING THINGS: NOT AT ALL
SUM OF ALL RESPONSES TO PHQ QUESTIONS 1-9: 0
2. FEELING DOWN, DEPRESSED OR HOPELESS: NOT AT ALL
SUM OF ALL RESPONSES TO PHQ QUESTIONS 1-9: 0

## 2024-06-10 NOTE — PROGRESS NOTES
Mayuri Thomas (:  1975) is a 48 y.o. female,Established patient, here for evaluation of the following chief complaint(s):  Rash (On both legs, got bit by sand fleas on may 13- in  )      Assessment & Plan   1. Bug bite, initial encounter  2. Dry skin    Bug bite- new diagnosis- almost completely healed; reassured patient, RTC prn.  Dry skin- new diagnosis- probably secondary to sunburn- improving; advised to use Lubricating lotion once a day after a shower.    Return if symptoms worsen or fail to improve.       Subjective   Rash  Pertinent negatives include no congestion, cough, diarrhea, fever, shortness of breath or vomiting.     Patient of Ms Cui, says she just came back from the [place] on May 18th- was there for 6 days. She says she was bitten by sand fleas (most likely)- mostly on the legs and a very few on the upper extremities- the bites have healed. However her skin on the legs is dry and has some peeling where she was bitten. She had itching right after the bites but not any more; reports some rash and redness in places- resolved; bite areas are leaving some marks- using cocoa butter, Mederma scar cream, Vitamin E oil; denies any fever, chills. She had a small area of sunburn over her left shoulder after she came back.    Review of Systems   Constitutional:  Negative for chills and fever.   HENT:  Negative for congestion and sinus pain.    Eyes:  Negative for photophobia and visual disturbance.   Respiratory:  Negative for cough, shortness of breath and wheezing.    Cardiovascular:  Negative for chest pain, palpitations and leg swelling.   Gastrointestinal:  Negative for abdominal pain, diarrhea, nausea and vomiting.   Skin:  Positive for rash. Negative for color change and wound.          Objective   Physical Exam  Vitals and nursing note reviewed.   Constitutional:       General: She is not in acute distress.  HENT:      Mouth/Throat:      Mouth: Mucous membranes are moist.

## 2024-08-15 ENCOUNTER — OFFICE VISIT (OUTPATIENT)
Dept: FAMILY MEDICINE CLINIC | Facility: CLINIC | Age: 49
End: 2024-08-15
Payer: COMMERCIAL

## 2024-08-15 VITALS
SYSTOLIC BLOOD PRESSURE: 120 MMHG | HEART RATE: 90 BPM | DIASTOLIC BLOOD PRESSURE: 70 MMHG | TEMPERATURE: 97.5 F | BODY MASS INDEX: 30.29 KG/M2 | WEIGHT: 181.8 LBS | OXYGEN SATURATION: 98 % | HEIGHT: 65 IN | RESPIRATION RATE: 16 BRPM

## 2024-08-15 DIAGNOSIS — N39.0 ACUTE UTI: Primary | ICD-10-CM

## 2024-08-15 DIAGNOSIS — B37.31 VAGINA, CANDIDIASIS: ICD-10-CM

## 2024-08-15 LAB
BILIRUBIN, URINE, POC: NEGATIVE
BLOOD URINE, POC: NORMAL
GLUCOSE URINE, POC: NEGATIVE
KETONES, URINE, POC: NEGATIVE
LEUKOCYTE ESTERASE, URINE, POC: NEGATIVE
NITRITE, URINE, POC: NEGATIVE
PH, URINE, POC: 5.5 (ref 4.6–8)
PROTEIN,URINE, POC: NEGATIVE
SPECIFIC GRAVITY, URINE, POC: 1.01 (ref 1–1.03)
URINALYSIS CLARITY, POC: CLEAR
URINALYSIS COLOR, POC: YELLOW
UROBILINOGEN, POC: NORMAL

## 2024-08-15 PROCEDURE — 81003 URINALYSIS AUTO W/O SCOPE: CPT | Performed by: FAMILY MEDICINE

## 2024-08-15 PROCEDURE — G8427 DOCREV CUR MEDS BY ELIG CLIN: HCPCS | Performed by: FAMILY MEDICINE

## 2024-08-15 PROCEDURE — 1036F TOBACCO NON-USER: CPT | Performed by: FAMILY MEDICINE

## 2024-08-15 PROCEDURE — 99213 OFFICE O/P EST LOW 20 MIN: CPT | Performed by: FAMILY MEDICINE

## 2024-08-15 PROCEDURE — G8417 CALC BMI ABV UP PARAM F/U: HCPCS | Performed by: FAMILY MEDICINE

## 2024-08-15 RX ORDER — CIPROFLOXACIN 250 MG/1
250 TABLET, FILM COATED ORAL 2 TIMES DAILY
Qty: 6 TABLET | Refills: 0 | Status: SHIPPED | OUTPATIENT
Start: 2024-08-15 | End: 2024-08-18

## 2024-08-15 ASSESSMENT — PATIENT HEALTH QUESTIONNAIRE - PHQ9
1. LITTLE INTEREST OR PLEASURE IN DOING THINGS: NOT AT ALL
SUM OF ALL RESPONSES TO PHQ QUESTIONS 1-9: 0
2. FEELING DOWN, DEPRESSED OR HOPELESS: NOT AT ALL
SUM OF ALL RESPONSES TO PHQ QUESTIONS 1-9: 0
SUM OF ALL RESPONSES TO PHQ QUESTIONS 1-9: 0
SUM OF ALL RESPONSES TO PHQ9 QUESTIONS 1 & 2: 0
SUM OF ALL RESPONSES TO PHQ QUESTIONS 1-9: 0

## 2024-08-15 NOTE — PROGRESS NOTES
Mayuri Thomas (:  1975) is a 48 y.o. female,Established patient, here for evaluation of the following chief complaint(s):  Urinary Tract Infection (Been going on for two weeks, right side pain started this week.) and Ear Fullness (Right ear)         Assessment & Plan  Acute UTI    Relatively new diagnosis with worsening symptoms.  UA- trace blood; await urine culture.  Urine analysis was discussed with the patient. Start Ciprofloxacin x 3 days, discussed possible side effects and possible interaction with Xarelto. Advised patient to drink plenty of water- 64 oz a day, may also have 4 ounces of cranberry juice a day or dried cranberries or cranberry supplement pills. Discussed behavioral changes that can decrease the frequency of urinary infections including but not limited to eliminating constipation, front to back wiping, frequent voiding to keep bladder volumes low, double voiding, avoid soaking in water (including baths, pools, hot tubs), use of probiotics. Patient was advised to minimize sugary beverages and other known bladder irritants such as caffeinated drinks, sodas, etc. Patient was advised to call us or return to clinic in 48 hours if symptoms persist or worsen.    Orders:    AMB POC URINALYSIS DIP STICK AUTO W/O MICRO    Culture, Urine; Future    ciprofloxacin (CIPRO) 250 MG tablet; Take 1 tablet by mouth 2 times daily for 3 days    Culture, Urine    Vagina, candidiasis    Prescribed Diflucan- to take it 12 hours after the last dose of the antibiotic; to minimize concentrated sweets, to eat yogurt and/ or Probiotic. Advised to use otc Monistat PV if she develops symptoms before that.           Return if symptoms worsen or fail to improve.       Subjective   Urinary Tract Infection  Pertinent negatives include no hematuria.   Ear Fullness   Associated symptoms include abdominal pain. Pertinent negatives include no coughing, diarrhea or vomiting.     Patient of Ms See, comes today saying

## 2024-08-18 LAB
BACTERIA SPEC CULT: NORMAL
SERVICE CMNT-IMP: NORMAL

## 2024-08-30 ENCOUNTER — TRANSCRIBE ORDERS (OUTPATIENT)
Dept: SCHEDULING | Age: 49
End: 2024-08-30

## 2024-08-30 DIAGNOSIS — Z12.31 SCREENING MAMMOGRAM FOR HIGH-RISK PATIENT: Primary | ICD-10-CM

## 2024-10-03 ENCOUNTER — TELEPHONE (OUTPATIENT)
Dept: FAMILY MEDICINE CLINIC | Facility: CLINIC | Age: 49
End: 2024-10-03

## 2024-10-03 NOTE — TELEPHONE ENCOUNTER
----- Message from Calli BALL sent at 9/30/2024  2:24 PM EDT -----  Regarding: [facility] Appointment Request  [facility] Appointment Request    Patient needs appointment for [facility] Appointment Type: Annual Visit.    Patient Requested Dates(s):Any day before October 14, 2024  Patient Requested Time:Any time  Provider Name:Steffi Cui APRN - CNP    Reason for Appointment Request: Established Patient - Available appointments did not meet patient need  --------------------------------------------------------------------------------------------------------------------------    Relationship to Patient: Self     Call Back Information: OK to leave message on voicemail  Preferred Call Back Number: Phone [phone]

## 2024-11-22 ENCOUNTER — HOSPITAL ENCOUNTER (OUTPATIENT)
Dept: MAMMOGRAPHY | Age: 49
Discharge: HOME | End: 2024-11-22
Payer: COMMERCIAL

## 2024-11-22 VITALS — BODY MASS INDEX: 29.16 KG/M2 | WEIGHT: 175 LBS | HEIGHT: 65 IN

## 2024-11-22 DIAGNOSIS — Z12.31 ENCOUNTER FOR SCREENING MAMMOGRAM FOR HIGH-RISK PATIENT: ICD-10-CM

## 2024-11-22 PROCEDURE — 77063 BREAST TOMOSYNTHESIS BI: CPT

## 2024-11-25 ENCOUNTER — OFFICE VISIT (OUTPATIENT)
Dept: FAMILY MEDICINE CLINIC | Facility: CLINIC | Age: 49
End: 2024-11-25
Payer: COMMERCIAL

## 2024-11-25 VITALS
HEART RATE: 67 BPM | TEMPERATURE: 97.1 F | DIASTOLIC BLOOD PRESSURE: 80 MMHG | SYSTOLIC BLOOD PRESSURE: 128 MMHG | RESPIRATION RATE: 18 BRPM | OXYGEN SATURATION: 99 % | WEIGHT: 177 LBS | HEIGHT: 65 IN | BODY MASS INDEX: 29.49 KG/M2

## 2024-11-25 DIAGNOSIS — Z00.00 PHYSICAL EXAM, ANNUAL: Primary | ICD-10-CM

## 2024-11-25 DIAGNOSIS — Z00.00 PHYSICAL EXAM, ANNUAL: ICD-10-CM

## 2024-11-25 LAB
ALBUMIN SERPL-MCNC: 3.4 G/DL (ref 3.5–5)
ALBUMIN/GLOB SERPL: 0.9 (ref 1–1.9)
ALP SERPL-CCNC: 68 U/L (ref 35–104)
ALT SERPL-CCNC: 8 U/L (ref 8–45)
ANION GAP SERPL CALC-SCNC: 10 MMOL/L (ref 7–16)
AST SERPL-CCNC: 18 U/L (ref 15–37)
BASOPHILS # BLD: 0.1 K/UL (ref 0–0.2)
BASOPHILS NFR BLD: 1 % (ref 0–2)
BILIRUB SERPL-MCNC: 0.2 MG/DL (ref 0–1.2)
BUN SERPL-MCNC: 7 MG/DL (ref 6–23)
CALCIUM SERPL-MCNC: 9.7 MG/DL (ref 8.8–10.2)
CHLORIDE SERPL-SCNC: 103 MMOL/L (ref 98–107)
CHOLEST SERPL-MCNC: 215 MG/DL (ref 0–200)
CO2 SERPL-SCNC: 26 MMOL/L (ref 20–29)
CREAT SERPL-MCNC: 0.82 MG/DL (ref 0.6–1.1)
DIFFERENTIAL METHOD BLD: ABNORMAL
EOSINOPHIL # BLD: 0.2 K/UL (ref 0–0.8)
EOSINOPHIL NFR BLD: 2 % (ref 0.5–7.8)
ERYTHROCYTE [DISTWIDTH] IN BLOOD BY AUTOMATED COUNT: 16.7 % (ref 11.9–14.6)
GLOBULIN SER CALC-MCNC: 3.7 G/DL (ref 2.3–3.5)
GLUCOSE SERPL-MCNC: 98 MG/DL (ref 70–99)
HCT VFR BLD AUTO: 35.6 % (ref 35.8–46.3)
HDLC SERPL-MCNC: 52 MG/DL (ref 40–60)
HDLC SERPL: 4.1 (ref 0–5)
HGB BLD-MCNC: 11.1 G/DL (ref 11.7–15.4)
IMM GRANULOCYTES # BLD AUTO: 0 K/UL (ref 0–0.5)
IMM GRANULOCYTES NFR BLD AUTO: 0 % (ref 0–5)
LDLC SERPL CALC-MCNC: 135 MG/DL (ref 0–100)
LYMPHOCYTES # BLD: 2.5 K/UL (ref 0.5–4.6)
LYMPHOCYTES NFR BLD: 40 % (ref 13–44)
MCH RBC QN AUTO: 27.3 PG (ref 26.1–32.9)
MCHC RBC AUTO-ENTMCNC: 31.2 G/DL (ref 31.4–35)
MCV RBC AUTO: 87.7 FL (ref 82–102)
MONOCYTES # BLD: 0.5 K/UL (ref 0.1–1.3)
MONOCYTES NFR BLD: 8 % (ref 4–12)
NEUTS SEG # BLD: 3 K/UL (ref 1.7–8.2)
NEUTS SEG NFR BLD: 49 % (ref 43–78)
NRBC # BLD: 0 K/UL (ref 0–0.2)
PLATELET # BLD AUTO: 307 K/UL (ref 150–450)
PMV BLD AUTO: 10.3 FL (ref 9.4–12.3)
POTASSIUM SERPL-SCNC: 4.1 MMOL/L (ref 3.5–5.1)
PROT SERPL-MCNC: 7.1 G/DL (ref 6.3–8.2)
RBC # BLD AUTO: 4.06 M/UL (ref 4.05–5.2)
SODIUM SERPL-SCNC: 139 MMOL/L (ref 136–145)
TRIGL SERPL-MCNC: 137 MG/DL (ref 0–150)
TSH, 3RD GENERATION: 1.78 UIU/ML (ref 0.27–4.2)
VLDLC SERPL CALC-MCNC: 27 MG/DL (ref 6–23)
WBC # BLD AUTO: 6.3 K/UL (ref 4.3–11.1)

## 2024-11-25 PROCEDURE — 99396 PREV VISIT EST AGE 40-64: CPT | Performed by: NURSE PRACTITIONER

## 2024-11-25 PROCEDURE — G8484 FLU IMMUNIZE NO ADMIN: HCPCS | Performed by: NURSE PRACTITIONER

## 2024-11-25 SDOH — ECONOMIC STABILITY: FOOD INSECURITY: WITHIN THE PAST 12 MONTHS, THE FOOD YOU BOUGHT JUST DIDN'T LAST AND YOU DIDN'T HAVE MONEY TO GET MORE.: NEVER TRUE

## 2024-11-25 SDOH — ECONOMIC STABILITY: FOOD INSECURITY: WITHIN THE PAST 12 MONTHS, YOU WORRIED THAT YOUR FOOD WOULD RUN OUT BEFORE YOU GOT MONEY TO BUY MORE.: NEVER TRUE

## 2024-11-25 SDOH — ECONOMIC STABILITY: INCOME INSECURITY: HOW HARD IS IT FOR YOU TO PAY FOR THE VERY BASICS LIKE FOOD, HOUSING, MEDICAL CARE, AND HEATING?: NOT HARD AT ALL

## 2024-11-25 ASSESSMENT — PATIENT HEALTH QUESTIONNAIRE - PHQ9
SUM OF ALL RESPONSES TO PHQ9 QUESTIONS 1 & 2: 0
SUM OF ALL RESPONSES TO PHQ QUESTIONS 1-9: 0
SUM OF ALL RESPONSES TO PHQ QUESTIONS 1-9: 0
1. LITTLE INTEREST OR PLEASURE IN DOING THINGS: NOT AT ALL
SUM OF ALL RESPONSES TO PHQ QUESTIONS 1-9: 0
2. FEELING DOWN, DEPRESSED OR HOPELESS: NOT AT ALL
SUM OF ALL RESPONSES TO PHQ QUESTIONS 1-9: 0

## 2024-11-25 ASSESSMENT — ENCOUNTER SYMPTOMS
WHEEZING: 0
VOMITING: 0
ABDOMINAL PAIN: 0
COUGH: 0
CHEST TIGHTNESS: 0
RHINORRHEA: 0
SHORTNESS OF BREATH: 0
CONSTIPATION: 0
DIARRHEA: 0
EYE DISCHARGE: 0
BLOOD IN STOOL: 0
EYE PAIN: 0

## 2024-11-25 NOTE — PROGRESS NOTES
Mayuri Thomas (:  1975) is a 48 y.o. female,Established patient, here for evaluation of the following chief complaint(s):  Annual Exam         Results for orders placed or performed during the hospital encounter of 24   [facility] DIGITAL SCREEN BILATERAL    Narrative    STUDY:  Bilateral digital mammogram and tomosynthesis with CAD    INDICATION:  Screening    ADDITIONAL HISTORY: Estimated lifetime risk of breast cancer calculated to be  19.4%. Based on the Tyrer-Cuzick model.    American [employer] Society screening recommendation: Women who are at high risk for  breast cancer based on certain factors should get a breast MRI and a mammogram  every year, typically starting at age 30. This includes women who have a  lifetime risk of breast cancer about 20 to 25% or greater, according to risk  assessment tools that are based mainly on family history.    COMPARISON:     and .    TECHNIQUE: Bilateral digital mammography was performed, and is interpreted in  conjunction with a computer assisted detection (CAD) system.  Bilateral breast  tomography was also performed.    BREAST DENSITY: The breasts are extremely dense which lowers the sensitivity of  mammography.    (#BDD)    FINDINGS: There are multiple and bilateral low-density oval masses that are  fluctuating in size and number, compatible with benign fibrocystic changes.   No  suspicious masses, calcifications or areas of architectural distortion are  identified.      Impression    No mammographic evidence of malignancy. Annual screening is  recommended.    BI-RADS Assessment Category 2: Benign finding. (#BRad2)Annual mammograms are  recommended for all women over the age of 40.   A reminder letter will be sent  to the patient.    Electronically signed by Jax Clemens    Performing Facility: [facility] [address] [place] [address]   , [address] [place], [place] [address] Phone: [phone]     Results for

## 2025-08-13 ENCOUNTER — OFFICE VISIT (OUTPATIENT)
Dept: ORTHOPEDIC SURGERY | Age: 50
End: 2025-08-13
Payer: COMMERCIAL

## 2025-08-13 DIAGNOSIS — M79.674 TOE PAIN, RIGHT: Primary | ICD-10-CM

## 2025-08-13 DIAGNOSIS — S92.511A CLOSED DISPLACED FRACTURE OF PROXIMAL PHALANX OF LESSER TOE OF RIGHT FOOT, INITIAL ENCOUNTER: ICD-10-CM

## 2025-08-13 PROCEDURE — 3017F COLORECTAL CA SCREEN DOC REV: CPT | Performed by: ORTHOPAEDIC SURGERY

## 2025-08-13 PROCEDURE — G8428 CUR MEDS NOT DOCUMENT: HCPCS | Performed by: ORTHOPAEDIC SURGERY

## 2025-08-13 PROCEDURE — 1036F TOBACCO NON-USER: CPT | Performed by: ORTHOPAEDIC SURGERY

## 2025-08-13 PROCEDURE — 99214 OFFICE O/P EST MOD 30 MIN: CPT | Performed by: ORTHOPAEDIC SURGERY

## 2025-08-13 PROCEDURE — G8417 CALC BMI ABV UP PARAM F/U: HCPCS | Performed by: ORTHOPAEDIC SURGERY
